# Patient Record
Sex: MALE | Race: WHITE | NOT HISPANIC OR LATINO | URBAN - METROPOLITAN AREA
[De-identification: names, ages, dates, MRNs, and addresses within clinical notes are randomized per-mention and may not be internally consistent; named-entity substitution may affect disease eponyms.]

---

## 2018-05-28 ENCOUNTER — EMERGENCY (EMERGENCY)
Facility: HOSPITAL | Age: 33
LOS: 1 days | Discharge: HOME | End: 2018-05-28
Admitting: EMERGENCY MEDICINE
Payer: MEDICAID

## 2018-05-28 VITALS
OXYGEN SATURATION: 98 % | TEMPERATURE: 98 F | HEART RATE: 75 BPM | DIASTOLIC BLOOD PRESSURE: 115 MMHG | RESPIRATION RATE: 16 BRPM | SYSTOLIC BLOOD PRESSURE: 177 MMHG

## 2018-05-28 VITALS
HEART RATE: 104 BPM | DIASTOLIC BLOOD PRESSURE: 86 MMHG | RESPIRATION RATE: 18 BRPM | SYSTOLIC BLOOD PRESSURE: 132 MMHG | OXYGEN SATURATION: 98 % | TEMPERATURE: 98 F

## 2018-05-28 LAB
ALBUMIN SERPL ELPH-MCNC: 4.8 G/DL — SIGNIFICANT CHANGE UP (ref 3.3–5)
ALP SERPL-CCNC: 67 U/L — SIGNIFICANT CHANGE UP (ref 40–120)
ALT FLD-CCNC: 15 U/L — SIGNIFICANT CHANGE UP (ref 4–41)
AMPHET UR-MCNC: NEGATIVE — SIGNIFICANT CHANGE UP
APAP SERPL-MCNC: < 15 UG/ML — LOW (ref 15–25)
APPEARANCE UR: CLEAR — SIGNIFICANT CHANGE UP
AST SERPL-CCNC: 26 U/L — SIGNIFICANT CHANGE UP (ref 4–40)
BARBITURATES UR SCN-MCNC: NEGATIVE — SIGNIFICANT CHANGE UP
BASOPHILS # BLD AUTO: 0.1 K/UL — SIGNIFICANT CHANGE UP (ref 0–0.2)
BASOPHILS NFR BLD AUTO: 1.2 % — SIGNIFICANT CHANGE UP (ref 0–2)
BENZODIAZ UR-MCNC: NEGATIVE — SIGNIFICANT CHANGE UP
BILIRUB SERPL-MCNC: 0.7 MG/DL — SIGNIFICANT CHANGE UP (ref 0.2–1.2)
BILIRUB UR-MCNC: NEGATIVE — SIGNIFICANT CHANGE UP
BLOOD UR QL VISUAL: NEGATIVE — SIGNIFICANT CHANGE UP
BUN SERPL-MCNC: 8 MG/DL — SIGNIFICANT CHANGE UP (ref 7–23)
CALCIUM SERPL-MCNC: 9.4 MG/DL — SIGNIFICANT CHANGE UP (ref 8.4–10.5)
CANNABINOIDS UR-MCNC: NEGATIVE — SIGNIFICANT CHANGE UP
CHLORIDE SERPL-SCNC: 101 MMOL/L — SIGNIFICANT CHANGE UP (ref 98–107)
CO2 SERPL-SCNC: 22 MMOL/L — SIGNIFICANT CHANGE UP (ref 22–31)
COCAINE METAB.OTHER UR-MCNC: NEGATIVE — SIGNIFICANT CHANGE UP
COLOR SPEC: SIGNIFICANT CHANGE UP
CREAT SERPL-MCNC: 1.03 MG/DL — SIGNIFICANT CHANGE UP (ref 0.5–1.3)
EOSINOPHIL # BLD AUTO: 0.08 K/UL — SIGNIFICANT CHANGE UP (ref 0–0.5)
EOSINOPHIL NFR BLD AUTO: 1 % — SIGNIFICANT CHANGE UP (ref 0–6)
ETHANOL BLD-MCNC: 274 MG/DL — HIGH
GLUCOSE SERPL-MCNC: 79 MG/DL — SIGNIFICANT CHANGE UP (ref 70–99)
GLUCOSE UR-MCNC: NEGATIVE — SIGNIFICANT CHANGE UP
HCT VFR BLD CALC: 48.3 % — SIGNIFICANT CHANGE UP (ref 39–50)
HGB BLD-MCNC: 15.5 G/DL — SIGNIFICANT CHANGE UP (ref 13–17)
IMM GRANULOCYTES # BLD AUTO: 0.03 # — SIGNIFICANT CHANGE UP
IMM GRANULOCYTES NFR BLD AUTO: 0.4 % — SIGNIFICANT CHANGE UP (ref 0–1.5)
KETONES UR-MCNC: NEGATIVE — SIGNIFICANT CHANGE UP
LEUKOCYTE ESTERASE UR-ACNC: NEGATIVE — SIGNIFICANT CHANGE UP
LYMPHOCYTES # BLD AUTO: 2.03 K/UL — SIGNIFICANT CHANGE UP (ref 1–3.3)
LYMPHOCYTES # BLD AUTO: 24.3 % — SIGNIFICANT CHANGE UP (ref 13–44)
MCHC RBC-ENTMCNC: 25.6 PG — LOW (ref 27–34)
MCHC RBC-ENTMCNC: 32.1 % — SIGNIFICANT CHANGE UP (ref 32–36)
MCV RBC AUTO: 79.7 FL — LOW (ref 80–100)
METHADONE UR-MCNC: NEGATIVE — SIGNIFICANT CHANGE UP
MONOCYTES # BLD AUTO: 0.92 K/UL — HIGH (ref 0–0.9)
MONOCYTES NFR BLD AUTO: 11 % — SIGNIFICANT CHANGE UP (ref 2–14)
NEUTROPHILS # BLD AUTO: 5.18 K/UL — SIGNIFICANT CHANGE UP (ref 1.8–7.4)
NEUTROPHILS NFR BLD AUTO: 62.1 % — SIGNIFICANT CHANGE UP (ref 43–77)
NITRITE UR-MCNC: NEGATIVE — SIGNIFICANT CHANGE UP
NRBC # FLD: 0 — SIGNIFICANT CHANGE UP
OPIATES UR-MCNC: NEGATIVE — SIGNIFICANT CHANGE UP
OXYCODONE UR-MCNC: NEGATIVE — SIGNIFICANT CHANGE UP
PCP UR-MCNC: NEGATIVE — SIGNIFICANT CHANGE UP
PH UR: 6.5 — SIGNIFICANT CHANGE UP (ref 4.6–8)
PLATELET # BLD AUTO: 208 K/UL — SIGNIFICANT CHANGE UP (ref 150–400)
PMV BLD: 11.6 FL — SIGNIFICANT CHANGE UP (ref 7–13)
POTASSIUM SERPL-MCNC: 3.7 MMOL/L — SIGNIFICANT CHANGE UP (ref 3.5–5.3)
POTASSIUM SERPL-SCNC: 3.7 MMOL/L — SIGNIFICANT CHANGE UP (ref 3.5–5.3)
PROT SERPL-MCNC: 7.9 G/DL — SIGNIFICANT CHANGE UP (ref 6–8.3)
PROT UR-MCNC: 20 MG/DL — SIGNIFICANT CHANGE UP
RBC # BLD: 6.06 M/UL — HIGH (ref 4.2–5.8)
RBC # FLD: 18.9 % — HIGH (ref 10.3–14.5)
RBC CASTS # UR COMP ASSIST: SIGNIFICANT CHANGE UP (ref 0–?)
SALICYLATES SERPL-MCNC: < 5 MG/DL — LOW (ref 15–30)
SODIUM SERPL-SCNC: 144 MMOL/L — SIGNIFICANT CHANGE UP (ref 135–145)
SP GR SPEC: 1.01 — SIGNIFICANT CHANGE UP (ref 1–1.04)
SQUAMOUS # UR AUTO: SIGNIFICANT CHANGE UP
TSH SERPL-MCNC: 2.65 UIU/ML — SIGNIFICANT CHANGE UP (ref 0.27–4.2)
UROBILINOGEN FLD QL: NORMAL MG/DL — SIGNIFICANT CHANGE UP
WBC # BLD: 8.34 K/UL — SIGNIFICANT CHANGE UP (ref 3.8–10.5)
WBC # FLD AUTO: 8.34 K/UL — SIGNIFICANT CHANGE UP (ref 3.8–10.5)
WBC UR QL: SIGNIFICANT CHANGE UP (ref 0–?)

## 2018-05-28 PROCEDURE — 99285 EMERGENCY DEPT VISIT HI MDM: CPT

## 2018-05-28 RX ORDER — NICOTINE POLACRILEX 2 MG
4 GUM BUCCAL ONCE
Qty: 0 | Refills: 0 | Status: COMPLETED | OUTPATIENT
Start: 2018-05-28 | End: 2018-05-28

## 2018-05-28 RX ADMIN — Medication 4 MILLIGRAM(S): at 20:04

## 2018-05-28 NOTE — ED PROVIDER NOTE - PROGRESS NOTE DETAILS
GLENN Harrington patient is clinically sober A+O x 3 no distress noted. Reports he bindge drinks at times and drank 2 pints of vodka today. Reports he saw his family this weekend who are "well off" and he feels bad because he does not have the same lifestyle, currently residing with parents.  Denies any SI Denies any past psych history or admission. D3enis past withdraws or seizures.  Spoke with  mother Yessica [phone] who repost no safety concerns for self for patient.

## 2018-05-28 NOTE — ED PROVIDER NOTE - MEDICAL DECISION MAKING DETAILS
This is a 33 year old Male NO PMHX  for psych eval r/t suicidal ideations.  PER EMS Patient called 911 reporting he had a knife to his throat and was going to kill himself.  Patient reports "I had a few drinks today and I feel very bad." Patient is clinically sober Medical evaluation performed. There is no clinical evidence of intoxication or any acute medical problem requiring immediate intervention. [facility] crisis center

## 2018-05-28 NOTE — ED PROVIDER NOTE - OBJECTIVE STATEMENT
This is a 33 year old Male NO PMHX  for psych eval r/t suicidal ideations.  PER EMS Patient called 911 reporting he had a knife to his throat and was going to kill himself.  Patient reports "I had a few drinks today and I feel very bad" Drinks every two-three weeks. Denies past withdraws or seizures Denies AH/VH Denies any past medical history.

## 2018-05-28 NOTE — ED ADULT NURSE NOTE - OBJECTIVE STATEMENT
Pt to [facility] ER. Pt brought in by EMS for possible suicide attempt. Pt called 911 stating he had a knife ti his neck. No knife found on seen as per EMS. Pt admits to drinking 2-3 pints of alcohol today. Pt calm and cooperative currently. All belongings accounted for. Will continue to monitor.

## 2018-05-28 NOTE — ED ADULT TRIAGE NOTE - CHIEF COMPLAINT QUOTE
Per EMS Pt called 911 reporting he had a knife to his throat and was going to kill himself.  Pt denies SI HI depression hallucinations or any complaints at this time.  Hypertensive emotionally labile in triage.

## 2018-06-05 ENCOUNTER — EMERGENCY (EMERGENCY)
Facility: HOSPITAL | Age: 33
LOS: 1 days | Discharge: HOME | End: 2018-06-05
Attending: EMERGENCY MEDICINE | Admitting: EMERGENCY MEDICINE
Payer: MEDICAID

## 2018-06-05 VITALS
HEART RATE: 124 BPM | OXYGEN SATURATION: 100 % | SYSTOLIC BLOOD PRESSURE: 121 MMHG | RESPIRATION RATE: 16 BRPM | TEMPERATURE: 98 F | DIASTOLIC BLOOD PRESSURE: 73 MMHG

## 2018-06-05 PROCEDURE — 99283 EMERGENCY DEPT VISIT LOW MDM: CPT | Mod: 25
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
## 2018-06-05 NOTE — ED PROVIDER NOTE - PHYSICAL EXAMINATION
NEURO: EOMi, PERRLA, visual fields intact, tongue midline, negative romberg, strength 5/5 UE and LE bilaterally, normal gait, facial expressions intact, point to point intact, rapid alternating movements intact, sensate intact to UE and LE bilaterally. NVI  Neck: no midline tenderness, FROM  Back: no midline tenderness, no palpable step offs, FROM  no tenderness to extremities, moving all well  normal gait. NEURO: EOMi, PERRLA, visual fields intact, tongue midline, negative romberg, strength 5/5 UE and LE bilaterally, normal gait. facial expressions intact, point to point intact, rapid alternating movements intact, sensate intact to UE and LE bilaterally. NVI  Neck: no midline tenderness, FROM  Back: no midline tenderness, no palpable step offs, FROM  no tenderness to extremities, moving all well
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
## 2018-06-05 NOTE — ED PROVIDER NOTE - MEDICAL DECISION MAKING DETAILS
32 yo M here for intox. otherwise well. normal exam. PLAN: PO challenge, clinical sobriety and dc to police custody.
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
## 2018-06-06 VITALS
RESPIRATION RATE: 18 BRPM | TEMPERATURE: 98 F | HEART RATE: 109 BPM | SYSTOLIC BLOOD PRESSURE: 150 MMHG | DIASTOLIC BLOOD PRESSURE: 89 MMHG | OXYGEN SATURATION: 100 %

## 2018-10-30 NOTE — ED PROVIDER NOTE - ASSOCIATED PAIN OR INJURY
Assessment/Plan:    Assessment:  Patient presents with 4 day history of watery/painful right eye after doing overhead mechanical work without eye protection  Fluorescein stain performed  Negative for foreign body  Will treat for corneal abrasion with Gentamycin drops x5 days  Return to office for re-evaluation upon drop completion  Diagnoses and all orders for this visit:    Abrasion of right cornea, initial encounter  -     gentamicin (GARAMYCIN) 0 3 % ophthalmic solution; Administer 1 drop to the right eye every 4 (four) hours for 5 days          Subjective:      Patient ID: Anibal Arnold is a 62 y o  male  Chief Complaint   Patient presents with    Eye Strain     Watery R Eye x3d Pt flushed eye w/ little relief   Heartburn       HPI    61 yo male presents today with complaint of watery/pain right eye since Saturday  He was grinding metal in the am during which he did wear safety glasses  He was also doing over head mechanical work on a tractor during which he was not wearing eye protection  He feels something may have fallen into his eye at that time  He did try to wash his eye out later in the day Saturday with plain water  He states it feels like something is still in his eye at times when he moves it  He has been using lubricating eye drops with minimal relief  He denies any purulent drainage or crusting  Review of Systems   Eyes: Positive for pain (feels like something is in his eye), discharge (clear/watery) and redness  Negative for photophobia, itching and visual disturbance  Objective:      /94 (BP Location: Left arm, Patient Position: Sitting, Cuff Size: Standard)   Pulse 75   Temp 97 8 °F (36 6 °C) (Tympanic)   Resp 16   Ht 5' 10" (1 778 m)   Wt 84 1 kg (185 lb 4 8 oz)   SpO2 98%   BMI 26 59 kg/m²          Physical Exam   Constitutional: He is oriented to person, place, and time  He appears well-developed and well-nourished     Eyes: Pupils are equal, round, and reactive to light  Right eye exhibits discharge  Right conjunctiva is injected  Right eye exhibits no nystagmus  Pupil equal and reactive  No visual deficit noted  Neurological: He is alert and oriented to person, place, and time  Psychiatric: He has a normal mood and affect  no discrete location documentation necessary

## 2019-01-29 ENCOUNTER — INPATIENT (INPATIENT)
Facility: HOSPITAL | Age: 34
LOS: 2 days | Discharge: HOME | End: 2019-02-01
Attending: INTERNAL MEDICINE | Admitting: INTERNAL MEDICINE
Payer: SELF-PAY

## 2019-01-29 VITALS
TEMPERATURE: 101 F | OXYGEN SATURATION: 99 % | SYSTOLIC BLOOD PRESSURE: 130 MMHG | RESPIRATION RATE: 18 BRPM | HEART RATE: 120 BPM | DIASTOLIC BLOOD PRESSURE: 90 MMHG

## 2019-01-29 LAB
ALBUMIN SERPL ELPH-MCNC: 4.2 G/DL — SIGNIFICANT CHANGE UP (ref 3.3–5)
ALP SERPL-CCNC: 74 U/L — SIGNIFICANT CHANGE UP (ref 40–120)
ALT FLD-CCNC: 14 U/L — SIGNIFICANT CHANGE UP (ref 4–41)
ANION GAP SERPL CALC-SCNC: 16 MMO/L — HIGH (ref 7–14)
AST SERPL-CCNC: 24 U/L — SIGNIFICANT CHANGE UP (ref 4–40)
B PERT DNA SPEC QL NAA+PROBE: NOT DETECTED — SIGNIFICANT CHANGE UP
BASOPHILS # BLD AUTO: 0.02 K/UL — SIGNIFICANT CHANGE UP (ref 0–0.2)
BASOPHILS NFR BLD AUTO: 0.3 % — SIGNIFICANT CHANGE UP (ref 0–2)
BILIRUB SERPL-MCNC: 0.8 MG/DL — SIGNIFICANT CHANGE UP (ref 0.2–1.2)
BUN SERPL-MCNC: 12 MG/DL — SIGNIFICANT CHANGE UP (ref 7–23)
C PNEUM DNA SPEC QL NAA+PROBE: NOT DETECTED — SIGNIFICANT CHANGE UP
CALCIUM SERPL-MCNC: 9.1 MG/DL — SIGNIFICANT CHANGE UP (ref 8.4–10.5)
CHLORIDE SERPL-SCNC: 97 MMOL/L — LOW (ref 98–107)
CO2 SERPL-SCNC: 26 MMOL/L — SIGNIFICANT CHANGE UP (ref 22–31)
CREAT SERPL-MCNC: 1.02 MG/DL — SIGNIFICANT CHANGE UP (ref 0.5–1.3)
EOSINOPHIL # BLD AUTO: 0.01 K/UL — SIGNIFICANT CHANGE UP (ref 0–0.5)
EOSINOPHIL NFR BLD AUTO: 0.1 % — SIGNIFICANT CHANGE UP (ref 0–6)
FLUAV H1 2009 PAND RNA SPEC QL NAA+PROBE: NOT DETECTED — SIGNIFICANT CHANGE UP
FLUAV H1 RNA SPEC QL NAA+PROBE: NOT DETECTED — SIGNIFICANT CHANGE UP
FLUAV H3 RNA SPEC QL NAA+PROBE: NOT DETECTED — SIGNIFICANT CHANGE UP
FLUAV SUBTYP SPEC NAA+PROBE: NOT DETECTED — SIGNIFICANT CHANGE UP
FLUBV RNA SPEC QL NAA+PROBE: NOT DETECTED — SIGNIFICANT CHANGE UP
GLUCOSE SERPL-MCNC: 104 MG/DL — HIGH (ref 70–99)
HADV DNA SPEC QL NAA+PROBE: NOT DETECTED — SIGNIFICANT CHANGE UP
HCOV PNL SPEC NAA+PROBE: DETECTED — HIGH
HCT VFR BLD CALC: 48.4 % — SIGNIFICANT CHANGE UP (ref 39–50)
HGB BLD-MCNC: 15.1 G/DL — SIGNIFICANT CHANGE UP (ref 13–17)
HMPV RNA SPEC QL NAA+PROBE: NOT DETECTED — SIGNIFICANT CHANGE UP
HPIV1 RNA SPEC QL NAA+PROBE: NOT DETECTED — SIGNIFICANT CHANGE UP
HPIV2 RNA SPEC QL NAA+PROBE: NOT DETECTED — SIGNIFICANT CHANGE UP
HPIV3 RNA SPEC QL NAA+PROBE: NOT DETECTED — SIGNIFICANT CHANGE UP
HPIV4 RNA SPEC QL NAA+PROBE: NOT DETECTED — SIGNIFICANT CHANGE UP
IMM GRANULOCYTES NFR BLD AUTO: 0.7 % — SIGNIFICANT CHANGE UP (ref 0–1.5)
LIDOCAIN IGE QN: 17.4 U/L — SIGNIFICANT CHANGE UP (ref 7–60)
LYMPHOCYTES # BLD AUTO: 0.62 K/UL — LOW (ref 1–3.3)
LYMPHOCYTES # BLD AUTO: 8.1 % — LOW (ref 13–44)
MCHC RBC-ENTMCNC: 24.8 PG — LOW (ref 27–34)
MCHC RBC-ENTMCNC: 31.2 % — LOW (ref 32–36)
MCV RBC AUTO: 79.6 FL — LOW (ref 80–100)
MONOCYTES # BLD AUTO: 0.81 K/UL — SIGNIFICANT CHANGE UP (ref 0–0.9)
MONOCYTES NFR BLD AUTO: 10.6 % — SIGNIFICANT CHANGE UP (ref 2–14)
NEUTROPHILS # BLD AUTO: 6.13 K/UL — SIGNIFICANT CHANGE UP (ref 1.8–7.4)
NEUTROPHILS NFR BLD AUTO: 80.2 % — HIGH (ref 43–77)
NRBC # FLD: 0 K/UL — LOW (ref 25–125)
PLATELET # BLD AUTO: 211 K/UL — SIGNIFICANT CHANGE UP (ref 150–400)
PMV BLD: 11.7 FL — SIGNIFICANT CHANGE UP (ref 7–13)
POTASSIUM SERPL-MCNC: 4.1 MMOL/L — SIGNIFICANT CHANGE UP (ref 3.5–5.3)
POTASSIUM SERPL-SCNC: 4.1 MMOL/L — SIGNIFICANT CHANGE UP (ref 3.5–5.3)
PROT SERPL-MCNC: 7.2 G/DL — SIGNIFICANT CHANGE UP (ref 6–8.3)
RBC # BLD: 6.08 M/UL — HIGH (ref 4.2–5.8)
RBC # FLD: 19.2 % — HIGH (ref 10.3–14.5)
RSV RNA SPEC QL NAA+PROBE: NOT DETECTED — SIGNIFICANT CHANGE UP
RV+EV RNA SPEC QL NAA+PROBE: NOT DETECTED — SIGNIFICANT CHANGE UP
SODIUM SERPL-SCNC: 139 MMOL/L — SIGNIFICANT CHANGE UP (ref 135–145)
TROPONIN T, HIGH SENSITIVITY: 10 NG/L — SIGNIFICANT CHANGE UP (ref ?–14)
TROPONIN T, HIGH SENSITIVITY: 12 NG/L — SIGNIFICANT CHANGE UP (ref ?–14)
WBC # BLD: 7.64 K/UL — SIGNIFICANT CHANGE UP (ref 3.8–10.5)
WBC # FLD AUTO: 7.64 K/UL — SIGNIFICANT CHANGE UP (ref 3.8–10.5)

## 2019-01-29 PROCEDURE — 71046 X-RAY EXAM CHEST 2 VIEWS: CPT | Mod: 26

## 2019-01-29 RX ORDER — SODIUM CHLORIDE 9 MG/ML
1000 INJECTION INTRAMUSCULAR; INTRAVENOUS; SUBCUTANEOUS ONCE
Qty: 0 | Refills: 0 | Status: COMPLETED | OUTPATIENT
Start: 2019-01-29 | End: 2019-01-29

## 2019-01-29 RX ORDER — ACETAMINOPHEN 500 MG
650 TABLET ORAL ONCE
Qty: 0 | Refills: 0 | Status: COMPLETED | OUTPATIENT
Start: 2019-01-29 | End: 2019-01-29

## 2019-01-29 RX ADMIN — SODIUM CHLORIDE 1000 MILLILITER(S): 9 INJECTION INTRAMUSCULAR; INTRAVENOUS; SUBCUTANEOUS at 18:21

## 2019-01-29 RX ADMIN — SODIUM CHLORIDE 1000 MILLILITER(S): 9 INJECTION INTRAMUSCULAR; INTRAVENOUS; SUBCUTANEOUS at 18:39

## 2019-01-29 RX ADMIN — Medication 650 MILLIGRAM(S): at 18:20

## 2019-01-29 RX ADMIN — SODIUM CHLORIDE 1000 MILLILITER(S): 9 INJECTION INTRAMUSCULAR; INTRAVENOUS; SUBCUTANEOUS at 19:03

## 2019-01-29 RX ADMIN — Medication 650 MILLIGRAM(S): at 18:39

## 2019-01-29 NOTE — ED PROVIDER NOTE - OBJECTIVE STATEMENT
34yo male pmh EtOH abuse p/w chest pain radiating to left upper extremity x 20 minutes while sitting at work today. CP a/w SOB, diaphoresis, improved with SL nitro by EMS, received ASA 162mg. Pt with cough, rhinorrhea, sore throat, fatigue x 4d. No flu shot. Mild diarrhea, 1 episode vomiting. Drank 2 pints vodka yesterday and day prior. History hospitalization for withdrawal. Denies seizures in past. Denies sick contacts, recent travel, urinary symptoms, weight changes, drug use. Smokes 4 cigarettes daily.

## 2019-01-29 NOTE — ED PROVIDER NOTE - MEDICAL DECISION MAKING DETAILS
33M with URI symptoms p/w chest pain radiating to arm. +family cardiac hx. Labs with trop. CXR. Received aspirin by EMS. IVF. Reassess.

## 2019-01-29 NOTE — ED ADULT TRIAGE NOTE - AS TEMP SITE
traZODone (DESYREL) 50 MG tablet Take 1 tablet by mouth nightly 30 tablet 0    Blood Glucose Monitoring Suppl (ACCU-CHEK CARY PLUS) w/Device KIT 1 Device by Does not apply route 2 times daily Dx: E11.9 1 kit 0    [DISCONTINUED] metFORMIN (GLUCOPHAGE) 500 MG tablet Take 1 tablet by mouth 2 times daily (with meals) 180 tablet 0    [DISCONTINUED] spironolactone (ALDACTONE) 25 MG tablet Take 1 tablet by mouth daily 90 tablet 0    Lancets MISC accu-check cary  DX: E11.9 100 each 3    metoprolol succinate (TOPROL XL) 100 MG extended release tablet TAKE 1 TABLET EVERY DAY 90 tablet 1    apixaban (ELIQUIS) 5 MG TABS tablet Take 1 tablet by mouth 2 times daily 56 tablet 0    ACCU-CHEK CARY PLUS strip TEST BLOOD SUGAR FOUR TIMES DAILY 400 strip 3    insulin glargine (LANTUS) 100 UNIT/ML injection vial Inject 80 Units into the skin nightly      insulin lispro (HUMALOG) 100 UNIT/ML injection vial Inject 20 Units into the skin 3 times daily (before meals)      torsemide (DEMADEX) 20 MG tablet Take 20 mg by mouth 2 times daily      [DISCONTINUED] atorvastatin (LIPITOR) 80 MG tablet Take 80 mg by mouth every evening      Insulin Syringe-Needle U-100 (INSULIN SYRINGE .5CC/30GX1/2\") 30G X 1/2\" 0.5 ML MISC Use 5 times a day with insulin. GENERIC IS OK. 200 each 12    Insulin Pen Needle (B-D ULTRAFINE III SHORT PEN) 31G X 8 MM MISC 1 each by Does not apply route daily 100 each 3    albuterol sulfate HFA (PROAIR HFA) 108 (90 BASE) MCG/ACT inhaler Inhale 2 puffs into the lungs every 4 hours as needed for Wheezing or Shortness of Breath 1 Inhaler 0    Blood Glucose Calibration (ACCU-CHEK INSTANT CONTROL) LIQD 1 Bottle by In Vitro route 2 times daily 1 each 0    Alcohol Swabs (B-D SINGLE USE SWABS REGULAR) PADS 1 each by Does not apply route 2 times daily 100 each 0     No facility-administered encounter medications on file as of 6/18/2018.           Jadyn Márquez D.O. oral

## 2019-01-29 NOTE — ED ADULT TRIAGE NOTE - CHIEF COMPLAINT QUOTE
Pt brought in by EMS from for chest pain and hypertensive on scene. pt noted to be febrile and tachycardic in triage. Pt denies SOB, n/v/d.

## 2019-01-29 NOTE — ED PROVIDER NOTE - ATTENDING CONTRIBUTION TO CARE
Attending note:   After face to face evaluation of this patient, I concur with above noted hx, pe, and care plan for this patient.  32 y/o M alcoholic, smoker, hypertensive, non-compliant with cough, fever, coryza, sore throat now with c.o anterior chest pain.    EKG abnormal.   Evaluation in progress

## 2019-01-29 NOTE — ED PROVIDER NOTE - ST/T WAVE
no ST changes. Biphasic TW lateral precordial leads. no ST changes. Biphasic TW lateral precordial leads. TWI aVL, II

## 2019-01-29 NOTE — ED ADULT NURSE REASSESSMENT NOTE - NS ED NURSE REASSESS COMMENT FT1
received report from KORTNEY Lynn, pt aox3 in no apparent distress endorses relief of symptoms pt states just feels tired, VSS pt afebrile at this time, fluids completed pending RVP result will continue to monitor

## 2019-01-30 DIAGNOSIS — R07.9 CHEST PAIN, UNSPECIFIED: ICD-10-CM

## 2019-01-30 DIAGNOSIS — F10.10 ALCOHOL ABUSE, UNCOMPLICATED: ICD-10-CM

## 2019-01-30 DIAGNOSIS — B34.2 CORONAVIRUS INFECTION, UNSPECIFIED: ICD-10-CM

## 2019-01-30 DIAGNOSIS — I10 ESSENTIAL (PRIMARY) HYPERTENSION: ICD-10-CM

## 2019-01-30 DIAGNOSIS — Z29.9 ENCOUNTER FOR PROPHYLACTIC MEASURES, UNSPECIFIED: ICD-10-CM

## 2019-01-30 LAB
AMPHET UR-MCNC: NEGATIVE — SIGNIFICANT CHANGE UP
ANION GAP SERPL CALC-SCNC: 11 MMO/L — SIGNIFICANT CHANGE UP (ref 7–14)
APAP SERPL-MCNC: < 15 UG/ML — LOW (ref 15–25)
APTT BLD: 31.4 SEC — SIGNIFICANT CHANGE UP (ref 27.5–36.3)
BARBITURATES UR SCN-MCNC: NEGATIVE — SIGNIFICANT CHANGE UP
BASOPHILS # BLD AUTO: 0.01 K/UL — SIGNIFICANT CHANGE UP (ref 0–0.2)
BASOPHILS NFR BLD AUTO: 0.2 % — SIGNIFICANT CHANGE UP (ref 0–2)
BENZODIAZ UR-MCNC: NEGATIVE — SIGNIFICANT CHANGE UP
BUN SERPL-MCNC: 12 MG/DL — SIGNIFICANT CHANGE UP (ref 7–23)
CALCIUM SERPL-MCNC: 7.9 MG/DL — LOW (ref 8.4–10.5)
CANNABINOIDS UR-MCNC: NEGATIVE — SIGNIFICANT CHANGE UP
CHLORIDE SERPL-SCNC: 101 MMOL/L — SIGNIFICANT CHANGE UP (ref 98–107)
CHOLEST SERPL-MCNC: 96 MG/DL — LOW (ref 120–199)
CK MB BLD-MCNC: 0.4 — SIGNIFICANT CHANGE UP (ref 0–2.5)
CK MB BLD-MCNC: 1.77 NG/ML — SIGNIFICANT CHANGE UP (ref 1–6.6)
CK SERPL-CCNC: 458 U/L — HIGH (ref 30–200)
CO2 SERPL-SCNC: 27 MMOL/L — SIGNIFICANT CHANGE UP (ref 22–31)
COCAINE METAB.OTHER UR-MCNC: NEGATIVE — SIGNIFICANT CHANGE UP
CREAT SERPL-MCNC: 1.06 MG/DL — SIGNIFICANT CHANGE UP (ref 0.5–1.3)
EOSINOPHIL # BLD AUTO: 0.03 K/UL — SIGNIFICANT CHANGE UP (ref 0–0.5)
EOSINOPHIL NFR BLD AUTO: 0.6 % — SIGNIFICANT CHANGE UP (ref 0–6)
ETHANOL BLD-MCNC: < 10 MG/DL — SIGNIFICANT CHANGE UP
GLUCOSE SERPL-MCNC: 107 MG/DL — HIGH (ref 70–99)
HBA1C BLD-MCNC: 4.3 % — SIGNIFICANT CHANGE UP (ref 4–5.6)
HCT VFR BLD CALC: 42.9 % — SIGNIFICANT CHANGE UP (ref 39–50)
HDLC SERPL-MCNC: 41 MG/DL — SIGNIFICANT CHANGE UP (ref 35–55)
HGB BLD-MCNC: 13.4 G/DL — SIGNIFICANT CHANGE UP (ref 13–17)
IMM GRANULOCYTES NFR BLD AUTO: 0.6 % — SIGNIFICANT CHANGE UP (ref 0–1.5)
INR BLD: 1.29 — HIGH (ref 0.88–1.17)
INR BLD: 1.34 — HIGH (ref 0.88–1.17)
LIPID PNL WITH DIRECT LDL SERPL: 36 MG/DL — SIGNIFICANT CHANGE UP
LYMPHOCYTES # BLD AUTO: 1.23 K/UL — SIGNIFICANT CHANGE UP (ref 1–3.3)
LYMPHOCYTES # BLD AUTO: 26.3 % — SIGNIFICANT CHANGE UP (ref 13–44)
MAGNESIUM SERPL-MCNC: 1.6 MG/DL — SIGNIFICANT CHANGE UP (ref 1.6–2.6)
MCHC RBC-ENTMCNC: 25 PG — LOW (ref 27–34)
MCHC RBC-ENTMCNC: 31.2 % — LOW (ref 32–36)
MCV RBC AUTO: 80 FL — SIGNIFICANT CHANGE UP (ref 80–100)
METHADONE UR-MCNC: NEGATIVE — SIGNIFICANT CHANGE UP
MONOCYTES # BLD AUTO: 0.64 K/UL — SIGNIFICANT CHANGE UP (ref 0–0.9)
MONOCYTES NFR BLD AUTO: 13.7 % — SIGNIFICANT CHANGE UP (ref 2–14)
NEUTROPHILS # BLD AUTO: 2.73 K/UL — SIGNIFICANT CHANGE UP (ref 1.8–7.4)
NEUTROPHILS NFR BLD AUTO: 58.6 % — SIGNIFICANT CHANGE UP (ref 43–77)
NRBC # FLD: 0 K/UL — LOW (ref 25–125)
OPIATES UR-MCNC: NEGATIVE — SIGNIFICANT CHANGE UP
OXYCODONE UR-MCNC: NEGATIVE — SIGNIFICANT CHANGE UP
PCP UR-MCNC: NEGATIVE — SIGNIFICANT CHANGE UP
PHOSPHATE SERPL-MCNC: 2.8 MG/DL — SIGNIFICANT CHANGE UP (ref 2.5–4.5)
PLATELET # BLD AUTO: 161 K/UL — SIGNIFICANT CHANGE UP (ref 150–400)
PMV BLD: 11.4 FL — SIGNIFICANT CHANGE UP (ref 7–13)
POTASSIUM SERPL-MCNC: 3.5 MMOL/L — SIGNIFICANT CHANGE UP (ref 3.5–5.3)
POTASSIUM SERPL-SCNC: 3.5 MMOL/L — SIGNIFICANT CHANGE UP (ref 3.5–5.3)
PROTHROM AB SERPL-ACNC: 14.8 SEC — HIGH (ref 9.8–13.1)
PROTHROM AB SERPL-ACNC: 15 SEC — HIGH (ref 9.8–13.1)
RBC # BLD: 5.36 M/UL — SIGNIFICANT CHANGE UP (ref 4.2–5.8)
RBC # FLD: 18.6 % — HIGH (ref 10.3–14.5)
SALICYLATES SERPL-MCNC: < 5 MG/DL — LOW (ref 15–30)
SODIUM SERPL-SCNC: 139 MMOL/L — SIGNIFICANT CHANGE UP (ref 135–145)
TRIGL SERPL-MCNC: 109 MG/DL — SIGNIFICANT CHANGE UP (ref 10–149)
TROPONIN T, HIGH SENSITIVITY: 9 NG/L — SIGNIFICANT CHANGE UP (ref ?–14)
TSH SERPL-MCNC: 2.98 UIU/ML — SIGNIFICANT CHANGE UP (ref 0.27–4.2)
WBC # BLD: 4.67 K/UL — SIGNIFICANT CHANGE UP (ref 3.8–10.5)
WBC # FLD AUTO: 4.67 K/UL — SIGNIFICANT CHANGE UP (ref 3.8–10.5)

## 2019-01-30 PROCEDURE — 12345: CPT | Mod: NC

## 2019-01-30 PROCEDURE — 93010 ELECTROCARDIOGRAM REPORT: CPT

## 2019-01-30 PROCEDURE — 99222 1ST HOSP IP/OBS MODERATE 55: CPT

## 2019-01-30 PROCEDURE — 99223 1ST HOSP IP/OBS HIGH 75: CPT

## 2019-01-30 RX ORDER — THIAMINE MONONITRATE (VIT B1) 100 MG
100 TABLET ORAL DAILY
Qty: 0 | Refills: 0 | Status: DISCONTINUED | OUTPATIENT
Start: 2019-01-31 | End: 2019-02-01

## 2019-01-30 RX ORDER — SODIUM CHLORIDE 9 MG/ML
1000 INJECTION, SOLUTION INTRAVENOUS
Qty: 0 | Refills: 0 | Status: DISCONTINUED | OUTPATIENT
Start: 2019-01-30 | End: 2019-01-30

## 2019-01-30 RX ORDER — ACETAMINOPHEN 500 MG
650 TABLET ORAL EVERY 6 HOURS
Qty: 0 | Refills: 0 | Status: DISCONTINUED | OUTPATIENT
Start: 2019-01-30 | End: 2019-02-01

## 2019-01-30 RX ORDER — THIAMINE MONONITRATE (VIT B1) 100 MG
100 TABLET ORAL ONCE
Qty: 0 | Refills: 0 | Status: COMPLETED | OUTPATIENT
Start: 2019-01-30 | End: 2019-01-30

## 2019-01-30 RX ORDER — FOLIC ACID 0.8 MG
1 TABLET ORAL DAILY
Qty: 0 | Refills: 0 | Status: DISCONTINUED | OUTPATIENT
Start: 2019-01-31 | End: 2019-02-01

## 2019-01-30 RX ORDER — CALCIUM CARBONATE 500(1250)
1 TABLET ORAL ONCE
Qty: 0 | Refills: 0 | Status: COMPLETED | OUTPATIENT
Start: 2019-01-30 | End: 2019-01-30

## 2019-01-30 RX ORDER — ASPIRIN/CALCIUM CARB/MAGNESIUM 324 MG
81 TABLET ORAL DAILY
Qty: 0 | Refills: 0 | Status: DISCONTINUED | OUTPATIENT
Start: 2019-01-30 | End: 2019-02-01

## 2019-01-30 RX ORDER — HYDRALAZINE HCL 50 MG
10 TABLET ORAL THREE TIMES A DAY
Qty: 0 | Refills: 0 | Status: DISCONTINUED | OUTPATIENT
Start: 2019-01-30 | End: 2019-02-01

## 2019-01-30 RX ORDER — SODIUM CHLORIDE 9 MG/ML
1000 INJECTION, SOLUTION INTRAVENOUS
Qty: 0 | Refills: 0 | Status: DISCONTINUED | OUTPATIENT
Start: 2019-01-30 | End: 2019-02-01

## 2019-01-30 RX ADMIN — Medication 50 MILLIGRAM(S): at 20:34

## 2019-01-30 RX ADMIN — Medication 10 MILLIGRAM(S): at 14:06

## 2019-01-30 RX ADMIN — Medication 1 TABLET(S): at 14:06

## 2019-01-30 RX ADMIN — Medication 650 MILLIGRAM(S): at 18:57

## 2019-01-30 RX ADMIN — Medication 81 MILLIGRAM(S): at 12:20

## 2019-01-30 RX ADMIN — SODIUM CHLORIDE 100 MILLILITER(S): 9 INJECTION, SOLUTION INTRAVENOUS at 02:13

## 2019-01-30 RX ADMIN — SODIUM CHLORIDE 100 MILLILITER(S): 9 INJECTION, SOLUTION INTRAVENOUS at 14:06

## 2019-01-30 RX ADMIN — Medication 650 MILLIGRAM(S): at 18:04

## 2019-01-30 RX ADMIN — Medication 200 MILLIGRAM(S): at 07:16

## 2019-01-30 RX ADMIN — Medication 50 MILLIGRAM(S): at 14:06

## 2019-01-30 RX ADMIN — Medication 50 MILLIGRAM(S): at 02:45

## 2019-01-30 RX ADMIN — Medication 100 MILLIGRAM(S): at 01:25

## 2019-01-30 RX ADMIN — Medication 50 MILLIGRAM(S): at 08:44

## 2019-01-30 RX ADMIN — Medication 10 MILLIGRAM(S): at 21:49

## 2019-01-30 NOTE — H&P ADULT - PROBLEM SELECTOR PLAN 2
hx of ETOH abuse, with withdrawals, but no DTs, seizure hx. Last drink was Monday.   Given high risk of withdrawal, will start Librium taper, high risk CIWA  banana bag for 24 hours, c/w folic acid, thiamine orally after that   aspiration, seizure, fall precautions

## 2019-01-30 NOTE — H&P ADULT - NSHPREVIEWOFSYSTEMS_GEN_ALL_CORE
REVIEW OF SYSTEMS:    CONSTITUTIONAL: No weakness, fevers or chills  EYES/ENT: No visual changes;  No vertigo or throat pain   NECK: No pain or stiffness  RESPIRATORY: +cough, +shortness of breath  CARDIOVASCULAR: +CP  GASTROINTESTINAL: No abdominal or epigastric pain. No nausea, +vomiting, diarrhea. No melena or hematochezia.  GENITOURINARY: No dysuria, frequency or hematuria  NEUROLOGICAL: No numbness or weakness  SKIN: No itching, burning, rashes, or lesions   All other review of systems is negative unless indicated above.

## 2019-01-30 NOTE — H&P ADULT - HISTORY OF PRESENT ILLNESS
34 y/o M w/ PMHx HTN (not on meds), EtOH abuse (hx of withdrawal, no seizure), active smoker (4 cig/day) presenting with acute left sided CP. Pt states pain started on Tuesday while he was working, around 5 pm, pain was left sided chest pain, radiating to the left arm, associated with numbness. Pain lasted about 20 minutes, and was not worsening with palpation, non pleuritic. Since that time pain has been intermittent, only improved with sublingual nitroglycerin given by EMS. Pt also got  by EMS. Associated symptoms include: SOB, sweating, cough, diarrhea, vomiting x 1. No family history of heart attacks, sudden death.  Denies: syncope, nausea, sick contacts. Never had chest pain like this before.  Pt has a history of EtOH abuse, last drank on Sunday, Monday about 2-3 pints of Vodka, last drink before that was 2 weeks prior. Has history of withdrawal, never been to the ICU, never had seizures. Last time he had withdrawal like symptoms was a few weeks ago.    In the ED, vitals: T: 98.4, HR:  BP: 146-159/72-95, RR: 18, SpO2: 98% on RA  EKG showing no TEODORO, STD. TWI I, II, aVL, V4-V6  Trops 10 --> 12, RVP + for coronavirus

## 2019-01-30 NOTE — H&P ADULT - ASSESSMENT
32 y/o M w/ PMHx HTN (not on meds), EtOH abuse (hx of withdrawal, no seizure), active smoker (4 cig/day) presenting with acute left sided CP found to have cornavirus, r/o ACS at this time. On librium taper.

## 2019-01-30 NOTE — CHART NOTE - NSCHARTNOTEFT_GEN_A_CORE
**MEDICINE SECOND TOUCH**    Patient seen and examined this AM at bedside. Reports CP has resolved. CIWA scores remains low. Await TTE results. Labs and imaging reviewed:                          13.4   4.67  )-----------( 161      ( 30 Jan 2019 05:20 )             42.9     01-30    139  |  101  |  12  ----------------------------<  107<H>  3.5   |  27  |  1.06    Ca    7.9<L>      30 Jan 2019 05:20  Phos  2.8     01-30  Mg     1.6     01-30    TPro  7.2  /  Alb  4.2  /  TBili  0.8  /  DBili  x   /  AST  24  /  ALT  14  /  AlkPhos  74  01-29        Collette Vazquez  [id number]

## 2019-01-30 NOTE — ED ADULT NURSE REASSESSMENT NOTE - NS ED NURSE REASSESS COMMENT FT1
report given to ESSU1 RN. Pt brought over in no apparent distress aox3, denying any chest pain, headache, weakness or any other symptoms at this time, pt VSS, CIWA 0, NSR on monitor CIWA to be done @645 accepting RN aware

## 2019-01-30 NOTE — H&P ADULT - PROBLEM SELECTOR PLAN 3
hx of HTN, not on any meds at home  continue to monitor hx of HTN, not on any meds at home  consider metoprolol if needed  continue to monitor

## 2019-01-30 NOTE — H&P ADULT - NSHPPHYSICALEXAM_GEN_ALL_CORE
PHYSICAL EXAM:  GENERAL: NAD, well-developed  HEAD:  Atraumatic, Normocephalic  EYES: EOMI, PERRLA, conjunctiva and sclera clear  NECK: Supple, No JVD  CHEST/LUNG: Clear to auscultation bilaterally; No wheeze. Left chest wall not TTP.  HEART: Regular rate and rhythm; No murmurs, rubs, or gallops  ABDOMEN: Soft, Nontender, Nondistended; Bowel sounds present  EXTREMITIES:  2+ Peripheral Pulses, No clubbing, cyanosis, or edema. No tremors of UE b/l   PSYCH: AAOx3  NEUROLOGY: non-focal  SKIN: No rashes or lesions

## 2019-01-30 NOTE — H&P ADULT - NSHPLABSRESULTS_GEN_ALL_CORE
15.1   7.64  )-----------( 211      ( 29 Jan 2019 18:20 )             48.4       01-29    139  |  97<L>  |  12  ----------------------------<  104<H>  4.1   |  26  |  1.02    Ca    9.1      29 Jan 2019 18:20    TPro  7.2  /  Alb  4.2  /  TBili  0.8  /  DBili  x   /  AST  24  /  ALT  14  /  AlkPhos  74  01-29                  PT/INR - ( 30 Jan 2019 01:31 )   PT: 14.8 SEC;   INR: 1.29          PTT - ( 30 Jan 2019 01:31 )  PTT:31.4 SEC    Lactate Trend            CAPILLARY BLOOD GLUCOSE

## 2019-01-30 NOTE — CONSULT NOTE ADULT - SUBJECTIVE AND OBJECTIVE BOX
CHIEF COMPLAINT: chest pain     HISTORY OF PRESENT ILLNESS:32yo male pmh EtOH abuse, history  of hospitalization for withdrawal  p/w chest pain radiating to left upper extremity x 20 minutes while sitting at work today. CP was associate with SOB, diaphoresis, improved with SL nitro by EMS, received ASA 162mg. Pt with cough, rhinorrhea, sore throat, fatigue x 4d.  Mild diarrhea and  1 episode vomiting. Drank 2 pints vodka yesterday and day prior. . Denies seizures in past. Denies sick contacts, recent travel, urinary symptoms, weight changes, drug use. Smokes 4 cigarettes daily.     EKG: no ST changes. Biphasic TW lateral precordial leads. TWI aVL,    Allergies: No Known Allergies      	    MEDICATIONS:              thiamine 100 milliGRAM(s) Oral Once      PAST MEDICAL & SURGICAL HISTORY:  ETOH abuse  No significant past surgical history      FAMILY HISTORY:        SOCIAL HISTORY:    [ ] live with:  [ ] Non-smoker,[ ] smoker    [ ] no alcohol use [ ] alcohol use:      REVIEW OF SYSTEMS:  General: no fatigue/malaise, weight loss/gain.  Skin: no rashes.  Ophthalmologic: no blurred vision, no loss of vision. 	  ENT: no sore throat, rhinorrhea, sinus congestion.  Cardiovascular:no chest pain ,no palpitation,no dizziness,no diaphoresis,no edema  Respiratory: no SOB, cough or wheeze.  Gastrointestinal:  no N/V/D, no melena/hematemesis/hematochezia.  Genitourinary: no dysuria/hesitancy or hematuria.  Musculoskeletal: no myalgias or arthralgias.  Neurological: no changes in vision or hearing, no lightheadedness/dizziness, no syncope/near syncope	  Psychiatric: no unusual stress/anxiety.       PHYSICAL EXAM:  T(C): 36.9 (01-30-19 @ 00:04), Max: 38.2 (01-29-19 @ 17:51)  HR: 104 (01-30-19 @ 00:04) (104 - 120)  BP: 146/72 (01-30-19 @ 00:04) (130/90 - 159/95)  RR: 19 (01-30-19 @ 00:04) (18 - 20)  SpO2: 100% (01-30-19 @ 00:04) (97% - 100%)  Wt(kg): --  I&O's Summary      Appearance: Normal	  HEENT:   Normal oral mucosa, PERRL, EOMI	  Lymphatic: No lymphadenopathy  Cardiovascular: Normal S1 S2, No JVD, No murmurs, No edema  Respiratory: Lungs clear to auscultation	  Psychiatry: A & O x 3, Mood & affect appropriate  Gastrointestinal:  Soft, Non-tender, + BS	  Skin: No rashes, No ecchymoses, No cyanosis	  Neurologic: Non-focal  Extremities: Normal range of motion, No clubbing, cyanosis or edema  Vascular: Peripheral pulses palpable 2+ bilaterally        LABS:	 	    CBC Full  -  ( 29 Jan 2019 18:20 )  WBC Count : 7.64 K/uL  Hemoglobin : 15.1 g/dL  Hematocrit : 48.4 %  Platelet Count - Automated : 211 K/uL  Mean Cell Volume : 79.6 fL  Mean Cell Hemoglobin : 24.8 pg  Mean Cell Hemoglobin Concentration : 31.2 %  Auto Neutrophil # : 6.13 K/uL  Auto Lymphocyte # : 0.62 K/uL  Auto Monocyte # : 0.81 K/uL  Auto Eosinophil # : 0.01 K/uL  Auto Basophil # : 0.02 K/uL  Auto Neutrophil % : 80.2 %  Auto Lymphocyte % : 8.1 %  Auto Monocyte % : 10.6 %  Auto Eosinophil % : 0.1 %  Auto Basophil % : 0.3 %    01-29    139  |  97<L>  |  12  ----------------------------<  104<H>  4.1   |  26  |  1.02    Ca    9.1      29 Jan 2019 18:20    TPro  7.2  /  Alb  4.2  /  TBili  0.8  /  DBili  x   /  AST  24  /  ALT  14  /  AlkPhos  74  01-29      proBNP:   Lipid Profile:   HgA1c:   TSH:     High sensitive trop 10<< 12      CARDIAC MARKERS:          	    ECG:  	  RADIOLOGY:< from: Xray Chest 2 Views PA/Lat (01.29.19 @ 18:30) >  no urgent/emergent findings.    < end of copied text >     	     	  	  ASSESSMENT/PLAN: CHIEF COMPLAINT: chest pain     HISTORY OF PRESENT ILLNESS:32yo male pmh EtOH abuse, history  of hospitalization for withdrawal  presents with 10/10 sharp chest pain  radiating to left upper extremity x 20 minutes while sitting at work today. CP was associate with SOB, diaphoresis , dizziness , improved with SL nitro by EMS, received ASA 162mg. Patient also c/o cough, rhinorrhea, sore throat, fatigue x 4d.  Mild diarrhea and  1 episode vomiting x 2days ago. Drank 2 pints vodka yesterday and day prior.  Denies seizures in past. Denies sick contacts, recent travel, urinary symptoms, weight changes, drug use. Smokes 4 cigarettes daily. he report intermittent 4/10 left sided  non radiating chest pain for seconds since he is in hospital .The last pain was 1 hrs ago.cardiologu consulted for chest pain and EKG changes    EKG: NSR with no ST changes. Biphasic TWI in I, II.III,aVF,V6  biphasic TW in V4-V5 ,LVH    Allergies: No Known Allergies      	    MEDICATIONS:no home meds              thiamine 100 milliGRAM(s) Oral Once      PAST MEDICAL & SURGICAL HISTORY:  ETOH abuse  No significant past surgical history      FAMILY HISTORY:  no significant family history         SOCIAL HISTORY:    [ ] live with: alone   smoke 4 cigs /day   drink alcohol 2pints every weekend       REVIEW OF SYSTEMS:  General: + fatigue/malaise, no  weight loss/gain.  Skin: no rashes.  Ophthalmologic: no blurred vision, no loss of vision.. + tearing eye	  ENT: + sore throat, + rhinorrhea, + sinus congestion.  Cardiovascular:+ chest pain ,no palpitation, + dizziness, + diaphoresis,no edema  Respiratory: + SOB, no cough or wheeze.  Gastrointestinal:  + N/V/D, no melena/hematemesis/hematochezia.  Genitourinary: no dysuria/hesitancy or hematuria.  Musculoskeletal: no myalgias or arthralgias.  Neurological: no changes in vision or hearing, no lightheadedness/dizziness, no syncope/near syncope	  Psychiatric: no unusual stress/anxiety.       PHYSICAL EXAM:  T(C): 36.9 (01-30-19 @ 00:04), Max: 38.2 (01-29-19 @ 17:51)  HR: 104 (01-30-19 @ 00:04) (104 - 120)  BP: 146/72 (01-30-19 @ 00:04) (130/90 - 159/95)  RR: 19 (01-30-19 @ 00:04) (18 - 20)  SpO2: 100% (01-30-19 @ 00:04) (97% - 100%)      Appearance: Normal	  HEENT:   Normal oral mucosa, PERRL, EOMI	  Cardiovascular: Normal S1 S2, No JVD, No murmurs, No edema  Respiratory: Lungs clear to auscultation	  Psychiatry: A & O x 3, Mood & affect appropriate  Gastrointestinal:  Soft, Non-tender, + BS	  Skin: No rashes, No ecchymoses, No cyanosis	  Neurologic: Non-focal  Extremities: Normal range of motion, No clubbing, cyanosis or edema  Vascular: Peripheral pulses palpable 2+ bilaterally        LABS:	 	    CBC Full  -  ( 29 Jan 2019 18:20 )  WBC Count : 7.64 K/uL  Hemoglobin : 15.1 g/dL  Hematocrit : 48.4 %  Platelet Count - Automated : 211 K/uL  Mean Cell Volume : 79.6 fL  Mean Cell Hemoglobin : 24.8 pg  Mean Cell Hemoglobin Concentration : 31.2 %  Auto Neutrophil # : 6.13 K/uL  Auto Lymphocyte # : 0.62 K/uL  Auto Monocyte # : 0.81 K/uL  Auto Eosinophil # : 0.01 K/uL  Auto Basophil # : 0.02 K/uL  Auto Neutrophil % : 80.2 %  Auto Lymphocyte % : 8.1 %  Auto Monocyte % : 10.6 %  Auto Eosinophil % : 0.1 %  Auto Basophil % : 0.3 %    01-29    139  |  97<L>  |  12  ----------------------------<  104<H>  4.1   |  26  |  1.02    Ca    9.1      29 Jan 2019 18:20    TPro  7.2  /  Alb  4.2  /  TBili  0.8  /  DBili  x   /  AST  24  /  ALT  14  /  AlkPhos  74  01-29          High sensitive trop 10<< 12      CARDIAC MARKERS:          	    ECG:  	  RADIOLOGY:< from: Xray Chest 2 Views PA/Lat (01.29.19 @ 18:30) >  no urgent/emergent findings.    < end of copied text >     	     	  	  ASSESSMENT/PLAN: 32yo male pmh EtOH abuse, history  of hospitalization for withdrawal  presents with 10/10 sharp chest pain associates with sob, dizziness, diaphoresis found to have TWI in multiple leads with LVH criteria also with elevated BP    Plan:   Admit to medicine /tele  trend trop/ck  echo in am CHIEF COMPLAINT: chest pain     HISTORY OF PRESENT ILLNESS:34yo male pmh EtOH abuse, history  of hospitalization for withdrawal  presents with 10/10 sharp chest pain  radiating to left upper extremity x 20 minutes while sitting at work today. CP was associate with SOB, diaphoresis , dizziness , improved with SL nitro by EMS, received ASA 162mg. Patient also c/o cough, rhinorrhea, sore throat, fatigue x 4d.  Mild diarrhea and  1 episode vomiting x 2days ago. Drank 2 pints vodka yesterday and day prior.  Denies seizures in past. Denies sick contacts, recent travel, urinary symptoms, weight changes, drug use. Smokes 4 cigarettes daily. he report intermittent 4/10 left sided  non radiating chest pain for seconds since he is in hospital .The last pain was 1 hrs ago.cardiologu consulted for chest pain and EKG changes    EKG: NSR with no ST changes. Biphasic TWI in I, II.III,aVF,V6  biphasic TW in V4-V5 ,LVH    Allergies: No Known Allergies      	    MEDICATIONS:no home meds              thiamine 100 milliGRAM(s) Oral Once      PAST MEDICAL & SURGICAL HISTORY:  ETOH abuse  No significant past surgical history      FAMILY HISTORY:  no significant family history         SOCIAL HISTORY:    [ ] live with: alone   smoke 4 cigs /day   drink alcohol 2pints every weekend       REVIEW OF SYSTEMS:  General: + fatigue/malaise, no  weight loss/gain.  Skin: no rashes.  Ophthalmologic: no blurred vision, no loss of vision.. + tearing eye	  ENT: + sore throat, + rhinorrhea, + sinus congestion.  Cardiovascular:+ chest pain ,no palpitation, + dizziness, + diaphoresis,no edema  Respiratory: + SOB, no cough or wheeze.  Gastrointestinal:  + N/V/D, no melena/hematemesis/hematochezia.  Genitourinary: no dysuria/hesitancy or hematuria.  Musculoskeletal: no myalgias or arthralgias.  Neurological: no changes in vision or hearing, no lightheadedness/dizziness, no syncope/near syncope	  Psychiatric: no unusual stress/anxiety.       PHYSICAL EXAM:  T(C): 36.9 (01-30-19 @ 00:04), Max: 38.2 (01-29-19 @ 17:51)  HR: 104 (01-30-19 @ 00:04) (104 - 120)  BP: 146/72 (01-30-19 @ 00:04) (130/90 - 159/95)  RR: 19 (01-30-19 @ 00:04) (18 - 20)  SpO2: 100% (01-30-19 @ 00:04) (97% - 100%)      Appearance: Normal	  HEENT:   Normal oral mucosa, PERRL, EOMI	  Cardiovascular: Normal S1 S2, No JVD, No murmurs, No edema  Respiratory: Lungs clear to auscultation	  Psychiatry: A & O x 3, Mood & affect appropriate  Gastrointestinal:  Soft, Non-tender, + BS	  Skin: No rashes, No ecchymoses, No cyanosis	  Neurologic: Non-focal  Extremities: Normal range of motion, No clubbing, cyanosis or edema  Vascular: Peripheral pulses palpable 2+ bilaterally        LABS:	 	    CBC Full  -  ( 29 Jan 2019 18:20 )  WBC Count : 7.64 K/uL  Hemoglobin : 15.1 g/dL  Hematocrit : 48.4 %  Platelet Count - Automated : 211 K/uL  Mean Cell Volume : 79.6 fL  Mean Cell Hemoglobin : 24.8 pg  Mean Cell Hemoglobin Concentration : 31.2 %  Auto Neutrophil # : 6.13 K/uL  Auto Lymphocyte # : 0.62 K/uL  Auto Monocyte # : 0.81 K/uL  Auto Eosinophil # : 0.01 K/uL  Auto Basophil # : 0.02 K/uL  Auto Neutrophil % : 80.2 %  Auto Lymphocyte % : 8.1 %  Auto Monocyte % : 10.6 %  Auto Eosinophil % : 0.1 %  Auto Basophil % : 0.3 %    01-29    139  |  97<L>  |  12  ----------------------------<  104<H>  4.1   |  26  |  1.02    Ca    9.1      29 Jan 2019 18:20    TPro  7.2  /  Alb  4.2  /  TBili  0.8  /  DBili  x   /  AST  24  /  ALT  14  /  AlkPhos  74  01-29          High sensitive trop 10<< 12      CARDIAC MARKERS:          	    ECG:  	  RADIOLOGY:< from: Xray Chest 2 Views PA/Lat (01.29.19 @ 18:30) >  no urgent/emergent findings.    < end of copied text >     	     	  	  ASSESSMENT/PLAN: 34yo male pmh EtOH abuse, history  of hospitalization for withdrawal  presents with 10/10 sharp chest pain associates with sob, dizziness, diaphoresis found to have TWI in multiple leads with LVH criteria also with elevated BP    Plan:   Admit to medicine /tele  trend trop/ck  echo in am  start on hydralazine 10mg PO tid

## 2019-01-30 NOTE — H&P ADULT - PROBLEM SELECTOR PLAN 1
p/w typical CP (left sided, radiation to L arm, lasting few minutes to 20 min, relieved with SL NG), Risk factors include: HTN, obesity, alcohol abuse, active smoking.  HTrops 10-->12, EKG with TWI lateral, precordial leads  s/p , will continue with daily   TSH, lipid panel, HbA1c in the AM   Cardiology c/s, will consider Stress test p/w typical CP (left sided, radiation to L arm, lasting few minutes to 20 min, relieved with SL NG), Risk factors include: HTN, obesity, alcohol abuse, active smoking.  HTrops 10-->12, EKG with TWI lateral, precordial leads  s/p , will continue with daily   TSH, lipid panel, HbA1c in the AM   Cardiology c/s, will order TTE, trend CK, trop  consider Stress test in AM

## 2019-01-30 NOTE — CHART NOTE - NSCHARTNOTEFT_GEN_A_CORE
Informed by RN that patient with sinus tachycardia 116bpm, /103.   EKG performed with sinus tachychardia at 109 bpm.   Patient assessed at beside. C/o cough- patient positive for coronavirus on RVP. Denies chest pain, SOB or other symptoms.   Repeat /104. Temp 101, orally.     PE:   General: NAD   Heart: Tachycardia, +S1, +S2  Lungs: CTA bilaterally. No wheezing or crackles     Suspect sinus tachycardia i/s/o fever. Will give Tylenol. Repeat blood pressure following Tylenol. If remains elevated, consider additional oral anti-hypertensive medication. If patient remains tachycardic, consider hydralazine as cause for rebound tachycardia as this is a new medication for the patient. CXR with clear lungs. UTox negative. Will order UA and BCx for infectious w/u.     Theyumiko Acosta PA-C  [id number] Informed by RN that patient with sinus tachycardia 116bpm, /103.   EKG performed with sinus tachychardia at 109 bpm.   Patient assessed at beside. C/o cough- patient positive for coronavirus on RVP. Denies chest pain, SOB or other symptoms.   Repeat /104. Temp 101, orally.     PE:   General: NAD   Heart: Tachycardia, +S1, +S2  Lungs: CTA bilaterally. No wheezing or crackles     Suspect sinus tachycardia i/s/o fever. Will give Tylenol. Repeat blood pressure following Tylenol. If remains elevated, consider additional oral anti-hypertensive medication. If patient remains tachycardic, consider hydralazine as cause for rebound tachycardia as this is a new medication for the patient. CIWA 0-2. CXR with clear lungs. UTox negative. Will order UA and BCx for infectious w/u.     Theyumiko Acosta PA-C  [id number]

## 2019-01-31 DIAGNOSIS — F10.239 ALCOHOL DEPENDENCE WITH WITHDRAWAL, UNSPECIFIED: ICD-10-CM

## 2019-01-31 DIAGNOSIS — R07.9 CHEST PAIN, UNSPECIFIED: ICD-10-CM

## 2019-01-31 DIAGNOSIS — I10 ESSENTIAL (PRIMARY) HYPERTENSION: ICD-10-CM

## 2019-01-31 LAB
ANION GAP SERPL CALC-SCNC: 16 MMO/L — HIGH (ref 7–14)
APPEARANCE UR: CLEAR — SIGNIFICANT CHANGE UP
BACTERIA # UR AUTO: NEGATIVE — SIGNIFICANT CHANGE UP
BASOPHILS # BLD AUTO: 0.03 K/UL — SIGNIFICANT CHANGE UP (ref 0–0.2)
BASOPHILS NFR BLD AUTO: 0.6 % — SIGNIFICANT CHANGE UP (ref 0–2)
BILIRUB UR-MCNC: NEGATIVE — SIGNIFICANT CHANGE UP
BLOOD UR QL VISUAL: NEGATIVE — SIGNIFICANT CHANGE UP
BUN SERPL-MCNC: 9 MG/DL — SIGNIFICANT CHANGE UP (ref 7–23)
CALCIUM SERPL-MCNC: 9.4 MG/DL — SIGNIFICANT CHANGE UP (ref 8.4–10.5)
CHLORIDE SERPL-SCNC: 99 MMOL/L — SIGNIFICANT CHANGE UP (ref 98–107)
CK SERPL-CCNC: 386 U/L — HIGH (ref 30–200)
CO2 SERPL-SCNC: 25 MMOL/L — SIGNIFICANT CHANGE UP (ref 22–31)
COLOR SPEC: YELLOW — SIGNIFICANT CHANGE UP
CREAT SERPL-MCNC: 1.06 MG/DL — SIGNIFICANT CHANGE UP (ref 0.5–1.3)
D DIMER BLD IA.RAPID-MCNC: 317 NG/ML — SIGNIFICANT CHANGE UP
EOSINOPHIL # BLD AUTO: 0.09 K/UL — SIGNIFICANT CHANGE UP (ref 0–0.5)
EOSINOPHIL NFR BLD AUTO: 1.7 % — SIGNIFICANT CHANGE UP (ref 0–6)
GLUCOSE SERPL-MCNC: 91 MG/DL — SIGNIFICANT CHANGE UP (ref 70–99)
GLUCOSE UR-MCNC: NEGATIVE — SIGNIFICANT CHANGE UP
HCT VFR BLD CALC: 50.1 % — HIGH (ref 39–50)
HGB BLD-MCNC: 15.5 G/DL — SIGNIFICANT CHANGE UP (ref 13–17)
HYALINE CASTS # UR AUTO: NEGATIVE — SIGNIFICANT CHANGE UP
IMM GRANULOCYTES NFR BLD AUTO: 0.6 % — SIGNIFICANT CHANGE UP (ref 0–1.5)
KETONES UR-MCNC: SIGNIFICANT CHANGE UP
LACTATE SERPL-SCNC: 1.4 MMOL/L — SIGNIFICANT CHANGE UP (ref 0.5–2)
LEUKOCYTE ESTERASE UR-ACNC: NEGATIVE — SIGNIFICANT CHANGE UP
LYMPHOCYTES # BLD AUTO: 1.55 K/UL — SIGNIFICANT CHANGE UP (ref 1–3.3)
LYMPHOCYTES # BLD AUTO: 29.9 % — SIGNIFICANT CHANGE UP (ref 13–44)
MAGNESIUM SERPL-MCNC: 2.1 MG/DL — SIGNIFICANT CHANGE UP (ref 1.6–2.6)
MCHC RBC-ENTMCNC: 25 PG — LOW (ref 27–34)
MCHC RBC-ENTMCNC: 30.9 % — LOW (ref 32–36)
MCV RBC AUTO: 80.9 FL — SIGNIFICANT CHANGE UP (ref 80–100)
MONOCYTES # BLD AUTO: 0.66 K/UL — SIGNIFICANT CHANGE UP (ref 0–0.9)
MONOCYTES NFR BLD AUTO: 12.7 % — SIGNIFICANT CHANGE UP (ref 2–14)
NEUTROPHILS # BLD AUTO: 2.83 K/UL — SIGNIFICANT CHANGE UP (ref 1.8–7.4)
NEUTROPHILS NFR BLD AUTO: 54.5 % — SIGNIFICANT CHANGE UP (ref 43–77)
NITRITE UR-MCNC: NEGATIVE — SIGNIFICANT CHANGE UP
NRBC # FLD: 0 K/UL — LOW (ref 25–125)
PH UR: 6 — SIGNIFICANT CHANGE UP (ref 5–8)
PHOSPHATE SERPL-MCNC: 3.8 MG/DL — SIGNIFICANT CHANGE UP (ref 2.5–4.5)
PLATELET # BLD AUTO: 178 K/UL — SIGNIFICANT CHANGE UP (ref 150–400)
PMV BLD: 11.6 FL — SIGNIFICANT CHANGE UP (ref 7–13)
POTASSIUM SERPL-MCNC: 3.8 MMOL/L — SIGNIFICANT CHANGE UP (ref 3.5–5.3)
POTASSIUM SERPL-SCNC: 3.8 MMOL/L — SIGNIFICANT CHANGE UP (ref 3.5–5.3)
PROT UR-MCNC: 20 — SIGNIFICANT CHANGE UP
RBC # BLD: 6.19 M/UL — HIGH (ref 4.2–5.8)
RBC # FLD: 19 % — HIGH (ref 10.3–14.5)
RBC CASTS # UR COMP ASSIST: SIGNIFICANT CHANGE UP (ref 0–?)
SODIUM SERPL-SCNC: 140 MMOL/L — SIGNIFICANT CHANGE UP (ref 135–145)
SP GR SPEC: 1.02 — SIGNIFICANT CHANGE UP (ref 1–1.04)
SPECIMEN SOURCE: SIGNIFICANT CHANGE UP
SQUAMOUS # UR AUTO: SIGNIFICANT CHANGE UP
UROBILINOGEN FLD QL: NORMAL — SIGNIFICANT CHANGE UP
WBC # BLD: 5.19 K/UL — SIGNIFICANT CHANGE UP (ref 3.8–10.5)
WBC # FLD AUTO: 5.19 K/UL — SIGNIFICANT CHANGE UP (ref 3.8–10.5)
WBC UR QL: SIGNIFICANT CHANGE UP (ref 0–?)

## 2019-01-31 PROCEDURE — 93306 TTE W/DOPPLER COMPLETE: CPT | Mod: 26

## 2019-01-31 PROCEDURE — 99233 SBSQ HOSP IP/OBS HIGH 50: CPT

## 2019-01-31 RX ORDER — HYDRALAZINE HCL 50 MG
10 TABLET ORAL ONCE
Qty: 0 | Refills: 0 | Status: COMPLETED | OUTPATIENT
Start: 2019-01-31 | End: 2019-01-31

## 2019-01-31 RX ORDER — HYDROCHLOROTHIAZIDE 25 MG
12.5 TABLET ORAL DAILY
Qty: 0 | Refills: 0 | Status: DISCONTINUED | OUTPATIENT
Start: 2019-01-31 | End: 2019-02-01

## 2019-01-31 RX ORDER — BENZOCAINE AND MENTHOL 5; 1 G/100ML; G/100ML
1 LIQUID ORAL
Qty: 0 | Refills: 0 | Status: DISCONTINUED | OUTPATIENT
Start: 2019-01-31 | End: 2019-01-31

## 2019-01-31 RX ORDER — BENZOCAINE AND MENTHOL 5; 1 G/100ML; G/100ML
1 LIQUID ORAL EVERY 6 HOURS
Qty: 0 | Refills: 0 | Status: DISCONTINUED | OUTPATIENT
Start: 2019-01-31 | End: 2019-02-01

## 2019-01-31 RX ORDER — HYDROCHLOROTHIAZIDE 25 MG
12.5 TABLET ORAL DAILY
Qty: 0 | Refills: 0 | Status: DISCONTINUED | OUTPATIENT
Start: 2019-01-31 | End: 2019-01-31

## 2019-01-31 RX ADMIN — Medication 81 MILLIGRAM(S): at 12:30

## 2019-01-31 RX ADMIN — Medication 50 MILLIGRAM(S): at 22:24

## 2019-01-31 RX ADMIN — Medication 10 MILLIGRAM(S): at 05:38

## 2019-01-31 RX ADMIN — Medication 1 MILLIGRAM(S): at 12:30

## 2019-01-31 RX ADMIN — Medication 10 MILLIGRAM(S): at 22:21

## 2019-01-31 RX ADMIN — Medication 100 MILLIGRAM(S): at 12:30

## 2019-01-31 RX ADMIN — Medication 50 MILLIGRAM(S): at 05:35

## 2019-01-31 RX ADMIN — BENZOCAINE AND MENTHOL 1 LOZENGE: 5; 1 LIQUID ORAL at 02:02

## 2019-01-31 RX ADMIN — Medication 650 MILLIGRAM(S): at 02:02

## 2019-01-31 RX ADMIN — Medication 50 MILLIGRAM(S): at 13:07

## 2019-01-31 RX ADMIN — Medication 650 MILLIGRAM(S): at 02:50

## 2019-01-31 RX ADMIN — Medication 10 MILLIGRAM(S): at 02:02

## 2019-01-31 RX ADMIN — Medication 10 MILLIGRAM(S): at 13:07

## 2019-01-31 RX ADMIN — BENZOCAINE AND MENTHOL 1 LOZENGE: 5; 1 LIQUID ORAL at 23:06

## 2019-01-31 NOTE — CHART NOTE - NSCHARTNOTEFT_GEN_A_CORE
Patient a/w CP, abnormal EKG, ETOH abuse on CIWA, Uncontrolled HTN.  Patient seen by [facility] this admission rec Echo, & Hydralazine - pt started on Hydralazine 10 mg PO tid.     BP remains uncontrolled, pt c/o sore throat & mild headache, CIWA scores 0-3 - Tylenol given for headache, Cepacol for sore throat   & Hydralazine 10 mg PO x 1 additional dose given for BP control. Patient awaiting Echo.   Patient hemodynamically stable will continue to monitor       Vital Signs Last 24 Hrs  T(C): 37.1 (31 Jan 2019 00:50), Max: 38.3 (30 Jan 2019 17:59)  T(F): 98.7 (31 Jan 2019 00:50), Max: 101 (30 Jan 2019 17:59)  HR: 103 (31 Jan 2019 00:50) (92 - 116)  BP: 169/94 (31 Jan 2019 00:50) (142/88 - 177/113)  BP(mean): --  RR: 18 (31 Jan 2019 00:50) (17 - 26)  SpO2: 98% (31 Jan 2019 00:50) (95% - 100%)      No Known Allergies      MEDICATIONS  (STANDING):  aspirin  chewable 81 milliGRAM(s) Oral daily  chlordiazePOXIDE   Oral   chlordiazePOXIDE 50 milliGRAM(s) Oral every 8 hours  folic acid 1 milliGRAM(s) Oral daily  hydrALAZINE 10 milliGRAM(s) Oral three times a day  hydrALAZINE 10 milliGRAM(s) Oral once  multivitamin/thiamine/folic acid in sodium chloride 0.9% 1000 milliLiter(s) (100 mL/Hr) IV Continuous <Continuous>  thiamine 100 milliGRAM(s) Oral daily    MEDICATIONS  (PRN):  acetaminophen   Tablet .. 650 milliGRAM(s) Oral every 6 hours PRN Temp greater or equal to 38C (100.4F), Mild Pain (1 - 3), Moderate Pain (4 - 6)  benzocaine 15 mG/menthol 3.6 mG (Sugar-Free) Lozenge 1 Lozenge Oral every 4 hours PRN Sore Throat  benzocaine 15 mG/menthol 3.6 mG Lozenge 1 Lozenge Oral four times a day PRN Sore Throat  guaiFENesin    Syrup 200 milliGRAM(s) Oral every 6 hours PRN Cough  LORazepam   Injectable 2 milliGRAM(s) IV Push every 1 hour PRN Symptom-triggered: each CIWA -Ar score 8 or GREATER

## 2019-02-01 ENCOUNTER — TRANSCRIPTION ENCOUNTER (OUTPATIENT)
Age: 34
End: 2019-02-01

## 2019-02-01 VITALS
DIASTOLIC BLOOD PRESSURE: 89 MMHG | TEMPERATURE: 98 F | RESPIRATION RATE: 18 BRPM | HEART RATE: 102 BPM | OXYGEN SATURATION: 99 % | SYSTOLIC BLOOD PRESSURE: 145 MMHG

## 2019-02-01 LAB
ANION GAP SERPL CALC-SCNC: 14 MMO/L — SIGNIFICANT CHANGE UP (ref 7–14)
BUN SERPL-MCNC: 11 MG/DL — SIGNIFICANT CHANGE UP (ref 7–23)
CALCIUM SERPL-MCNC: 9.7 MG/DL — SIGNIFICANT CHANGE UP (ref 8.4–10.5)
CHLORIDE SERPL-SCNC: 99 MMOL/L — SIGNIFICANT CHANGE UP (ref 98–107)
CO2 SERPL-SCNC: 25 MMOL/L — SIGNIFICANT CHANGE UP (ref 22–31)
CREAT SERPL-MCNC: 1.04 MG/DL — SIGNIFICANT CHANGE UP (ref 0.5–1.3)
GLUCOSE SERPL-MCNC: 99 MG/DL — SIGNIFICANT CHANGE UP (ref 70–99)
HCT VFR BLD CALC: 49.4 % — SIGNIFICANT CHANGE UP (ref 39–50)
HGB BLD-MCNC: 15.2 G/DL — SIGNIFICANT CHANGE UP (ref 13–17)
MCHC RBC-ENTMCNC: 24.9 PG — LOW (ref 27–34)
MCHC RBC-ENTMCNC: 30.8 % — LOW (ref 32–36)
MCV RBC AUTO: 81 FL — SIGNIFICANT CHANGE UP (ref 80–100)
NRBC # FLD: 0 K/UL — LOW (ref 25–125)
PLATELET # BLD AUTO: 178 K/UL — SIGNIFICANT CHANGE UP (ref 150–400)
PMV BLD: 10.9 FL — SIGNIFICANT CHANGE UP (ref 7–13)
POTASSIUM SERPL-MCNC: 3.8 MMOL/L — SIGNIFICANT CHANGE UP (ref 3.5–5.3)
POTASSIUM SERPL-SCNC: 3.8 MMOL/L — SIGNIFICANT CHANGE UP (ref 3.5–5.3)
RBC # BLD: 6.1 M/UL — HIGH (ref 4.2–5.8)
RBC # FLD: 18.4 % — HIGH (ref 10.3–14.5)
SODIUM SERPL-SCNC: 138 MMOL/L — SIGNIFICANT CHANGE UP (ref 135–145)
SPECIMEN SOURCE: SIGNIFICANT CHANGE UP
WBC # BLD: 5.89 K/UL — SIGNIFICANT CHANGE UP (ref 3.8–10.5)
WBC # FLD AUTO: 5.89 K/UL — SIGNIFICANT CHANGE UP (ref 3.8–10.5)

## 2019-02-01 PROCEDURE — 99239 HOSP IP/OBS DSCHRG MGMT >30: CPT

## 2019-02-01 RX ORDER — ASPIRIN/CALCIUM CARB/MAGNESIUM 324 MG
1 TABLET ORAL
Qty: 0 | Refills: 0 | DISCHARGE
Start: 2019-02-01

## 2019-02-01 RX ORDER — LISINOPRIL 2.5 MG/1
1 TABLET ORAL
Qty: 0 | Refills: 0 | COMMUNITY
Start: 2019-02-01

## 2019-02-01 RX ORDER — FOLIC ACID 0.8 MG
1 TABLET ORAL
Qty: 0 | Refills: 0 | DISCHARGE
Start: 2019-02-01

## 2019-02-01 RX ORDER — HYDROCHLOROTHIAZIDE 25 MG
1 TABLET ORAL
Qty: 30 | Refills: 0
Start: 2019-02-01 | End: 2019-03-02

## 2019-02-01 RX ORDER — LISINOPRIL 2.5 MG/1
1 TABLET ORAL
Qty: 30 | Refills: 0
Start: 2019-02-01 | End: 2019-03-02

## 2019-02-01 RX ORDER — THIAMINE MONONITRATE (VIT B1) 100 MG
1 TABLET ORAL
Qty: 0 | Refills: 0 | DISCHARGE
Start: 2019-02-01

## 2019-02-01 RX ORDER — LISINOPRIL 2.5 MG/1
5 TABLET ORAL DAILY
Qty: 0 | Refills: 0 | Status: DISCONTINUED | OUTPATIENT
Start: 2019-02-01 | End: 2019-02-01

## 2019-02-01 RX ADMIN — Medication 1 MILLIGRAM(S): at 12:30

## 2019-02-01 RX ADMIN — Medication 50 MILLIGRAM(S): at 16:53

## 2019-02-01 RX ADMIN — Medication 12.5 MILLIGRAM(S): at 05:51

## 2019-02-01 RX ADMIN — LISINOPRIL 5 MILLIGRAM(S): 2.5 TABLET ORAL at 12:30

## 2019-02-01 RX ADMIN — Medication 100 MILLIGRAM(S): at 12:30

## 2019-02-01 RX ADMIN — Medication 81 MILLIGRAM(S): at 12:30

## 2019-02-01 RX ADMIN — Medication 10 MILLIGRAM(S): at 05:51

## 2019-02-01 NOTE — DISCHARGE NOTE ADULT - MEDICATION SUMMARY - MEDICATIONS TO TAKE
I will START or STAY ON the medications listed below when I get home from the hospital:    aspirin 81 mg oral tablet, chewable  -- 1 tab(s) by mouth once a day  -- Indication: For Cardioprotective    lisinopril 5 mg oral tablet  -- 1 tab(s) by mouth once a day  -- Indication: For High blood pressure    hydroCHLOROthiazide 12.5 mg oral capsule  -- 1 cap(s) by mouth once a day  -- Indication: For High blood pressure    Multiple Vitamins oral tablet  -- 1 tab(s) by mouth once a day   -- Indication: For Multivitamin    thiamine 100 mg oral tablet  -- 1 tab(s) by mouth once a day  -- Indication: For Supplement    folic acid 1 mg oral tablet  -- 1 tab(s) by mouth once a day  -- Indication: For Supplement

## 2019-02-01 NOTE — DISCHARGE NOTE ADULT - PATIENT PORTAL LINK FT
You can access the [facility], offered by [facility], by registering with the following website: [email or web address]

## 2019-02-01 NOTE — PROGRESS NOTE ADULT - PROBLEM SELECTOR PLAN 4
-AG 16 -resolved  -check lactate-WNL  -librium discontinue CIWA score 0  -thiamine/MVI/folate -AG 16 -resolved  -check lactate-WNL  -librium last dose 4 PM observe and discontinue librium CIWA score 0  -thiamine/MVI/folate

## 2019-02-01 NOTE — DISCHARGE NOTE ADULT - CARE PROVIDER_API CALL
Cecil Gilliam)  Cardiovascular Disease; Nuclear Cardiology  [address] [address] [place] [address]  Phone: [phone]  Fax: [phone]    Medicine clinic at [facility],   Phone: [phone]  Fax: (   )    -

## 2019-02-01 NOTE — DISCHARGE NOTE ADULT - PLAN OF CARE
Low sodium and fat diet, continue anti-hypertensive medications (lisinopril & hydrochlorothiazide), and follow up with primary care physician. Resolution of symptoms. During your hospitalization, your work up was negative for acute coronary syndrome (heart attack).  Continue medications as prescribed.  Follow up with your PCP & cardiology within 1-2 weeks; call for appointments. Supportive measures. Continue supportive measures.  Stay well hydrated & eat a balanced diet.  Get adequate sleep.  You may take Robitussin as needed for cough.  Practice good hand hygiene & cover your mouth when you cough to prevent spread of germs.  Follow up with your PCP. Abstinence from drinking alcohol. It is highly recommended that you abstain from drinking alcohol as this habit can cause/contribute to a multitude of health conditions.  You were referred to AA by social work.  Follow up with your PCP for ongoing support. To maintain a normal blood pressure to prevent heart attack, stroke and renal failure. It is highly recommended that you abstain from drinking alcohol as this habit can cause/contribute to a multitude of health conditions.  You were referred to AA by social work.  Follow up with your PCP for ongoing support.  Continue multivitamin, thiamine & folic acid supplements.

## 2019-02-01 NOTE — PROGRESS NOTE ADULT - PROBLEM SELECTOR PLAN 3
-BP elevated ON started on hydralazine await TTE if lactate normal would start HCTZ 12.5   -monitor BP
-BP cont HCTZ/lisiopril discontinue hydralazine and uptirate ACE as tolerated  -monitor BP

## 2019-02-01 NOTE — PROGRESS NOTE ADULT - ASSESSMENT
34yo male pmh EtOH abuse, history  of hospitalization for withdrawal  presents with 10/10 sharp chest pain with radiation to LT arm and relief with nitro associates with sob, dizziness, diaphoresis found to have TWI in multiple leads and  elevated BP.
32yo male pmh EtOH abuse, history  of hospitalization for withdrawal  presents with 10/10 sharp chest pain with radiation to LT arm and relief with nitro associates with sob, dizziness, diaphoresis found to have TWI in multiple leads and  elevated BP. alcohol use with

## 2019-02-01 NOTE — PROGRESS NOTE ADULT - PROBLEM SELECTOR PLAN 1
-atypical chest pain   -CE x2 neg  -await TTE r/o LVH/WMA  -pt still tachycardic wells score PE 1.5 low will check d-dimer
-atypical chest pain   -CE x2 neg  -TTE-reviewed with cardiology hyperdynamic LV secondary to LVH   -pt still tachycardic wells score PE 1.5 low will check d-dimer-neg and tachycardia improved

## 2019-02-01 NOTE — PROGRESS NOTE ADULT - PROBLEM SELECTOR PLAN 2
-fever 101 Bcx P suspect secondary to viral illness  -symptomatic treatment antipyretic and analgesics as needed  -check UA/Ucx
-fever 101 Bcx NGTD suspect secondary to viral illness  -symptomatic treatment antipyretic and analgesics as needed  -check UA/Ucx-neg

## 2019-02-01 NOTE — DISCHARGE NOTE ADULT - PROVIDER TOKENS
TOKEN:'[id number]',FREE:[LAST:[Medicine clinic at [facility]PHONE:[phone]],FAX:[(   )    -]]

## 2019-02-01 NOTE — PROGRESS NOTE ADULT - SUBJECTIVE AND OBJECTIVE BOX
Patient is a 33y old  Male who presents with a chief complaint of Patient is a 33y old  Male who presents with a chief complaint of chest pain (2019 09:52)      SUBJECTIVE / OVERNIGHT EVENTS: no further CP no fever tele rates improved. CIWA 1-0    MEDICATIONS  (STANDING):  aspirin  chewable 81 milliGRAM(s) Oral daily  chlordiazePOXIDE   Oral   chlordiazePOXIDE 50 milliGRAM(s) Oral every 12 hours  folic acid 1 milliGRAM(s) Oral daily  hydrALAZINE 10 milliGRAM(s) Oral three times a day  hydrochlorothiazide 12.5 milliGRAM(s) Oral daily  lisinopril 5 milliGRAM(s) Oral daily  multivitamin/thiamine/folic acid in sodium chloride 0.9% 1000 milliLiter(s) (100 mL/Hr) IV Continuous <Continuous>  thiamine 100 milliGRAM(s) Oral daily    MEDICATIONS  (PRN):  acetaminophen   Tablet .. 650 milliGRAM(s) Oral every 6 hours PRN Temp greater or equal to 38C (100.4F), Mild Pain (1 - 3), Moderate Pain (4 - 6)  benzocaine 15 mG/menthol 3.6 mG (Sugar-Free) Lozenge 1 Lozenge Oral every 6 hours PRN Sore Throat  guaiFENesin    Syrup 200 milliGRAM(s) Oral every 6 hours PRN Cough  LORazepam   Injectable 2 milliGRAM(s) IV Push every 1 hour PRN Symptom-triggered: each CIWA -Ar score 8 or GREATER        CAPILLARY BLOOD GLUCOSE        I&O's Summary    2019 07:01  -  2019 07:00  --------------------------------------------------------  IN: 350 mL / OUT: 1250 mL / NET: -900 mL        T(C): 36.7 (19 @ 09:42), Max: 37.2 (19 @ 12:28)  HR: 98 (19 @ 09:42) (71 - 115)  BP: 140/90 (19 @ 09:42) (140/90 - 155/109)  RR: 18 (19 @ 09:42) (17 - 18)  SpO2: 97% (19 @ 09:42) (97% - 100%)    PHYSICAL EXAM:  GENERAL: NAD, well-developed  HEAD:  Atraumatic, Normocephalic  EYES: EOMI, PERRLA, conjunctiva and sclera clear  NECK: Supple, No JVD  CHEST/LUNG: Clear to auscultation bilaterally; No wheeze  HEART: Regular rate and rhythm; No murmurs, rubs, or gallops  ABDOMEN: Soft, Nontender, Nondistended; Bowel sounds present  EXTREMITIES:  2+ Peripheral Pulses, No clubbing, cyanosis, or edema  PSYCH: AAOx3  NEUROLOGY: non-focal  SKIN: No rashes or lesions    LABS:                        15.2   5.89  )-----------( 178      ( 2019 06:20 )             49.4         138  |  99  |  11  ----------------------------<  99  3.8   |  25  |  1.04    Ca    9.7      2019 06:20  Phos  3.8       Mg     2.1             CARDIAC MARKERS ( 2019 06:40 )  x     / x     / 386 u/L / x     / x      CARDIAC MARKERS ( 2019 12:03 )  x     / x     / 458 u/L / 1.77 ng/mL / x          Urinalysis Basic - ( 2019 11:15 )    Color: YELLOW / Appearance: CLEAR / S.019 / pH: 6.0  Gluc: NEGATIVE / Ketone: SMALL  / Bili: NEGATIVE / Urobili: NORMAL   Blood: NEGATIVE / Protein: 20 / Nitrite: NEGATIVE   Leuk Esterase: NEGATIVE / RBC: 0-2 / WBC 0-2   Sq Epi: OCC / Non Sq Epi: x / Bacteria: NEGATIVE          RADIOLOGY & ADDITIONAL TESTS:    Imaging Personally Reviewed:< from: Transthoracic Echocardiogram (19 @ 20:34) >  CONCLUSIONS:  1. Increased relative wall thickness with normal left  ventricular mass index, consistent with concentric left  ventricular remodeling.  2. Hyperdynamic left ventricle.  3. Normal left ventricular diastolic function.  4. Normal right ventricular size and function.  5. Estimated right ventricular systolic pressure equals 16  mm Hg, assuming right atrial pressure equals 10 mm Hg,  consistent with normal pulmonary pressures.  *** No previous Echo exam.    < end of copied text >      Consultant(s) Notes Reviewed:      Care Discussed with Consultants/Other Providers:
Patient is a 33y old  Male who presents with a chief complaint of Patient is a 33y old  Male who presents with a chief complaint of chest pain (30 Jan 2019 02:37)      SUBJECTIVE / OVERNIGHT EVENTS: yesterday evening temp 101 BP remains uncontrolled, pt c/o sore throat & mild headache, CIWA scores 1-3 - Tylenol given for headache, Cepacol for sore throat. ST on tele      MEDICATIONS  (STANDING):  aspirin  chewable 81 milliGRAM(s) Oral daily  chlordiazePOXIDE   Oral   chlordiazePOXIDE 50 milliGRAM(s) Oral every 8 hours  folic acid 1 milliGRAM(s) Oral daily  hydrALAZINE 10 milliGRAM(s) Oral three times a day  multivitamin/thiamine/folic acid in sodium chloride 0.9% 1000 milliLiter(s) (100 mL/Hr) IV Continuous <Continuous>  thiamine 100 milliGRAM(s) Oral daily    MEDICATIONS  (PRN):  acetaminophen   Tablet .. 650 milliGRAM(s) Oral every 6 hours PRN Temp greater or equal to 38C (100.4F), Mild Pain (1 - 3), Moderate Pain (4 - 6)  benzocaine 15 mG/menthol 3.6 mG (Sugar-Free) Lozenge 1 Lozenge Oral every 6 hours PRN Sore Throat  guaiFENesin    Syrup 200 milliGRAM(s) Oral every 6 hours PRN Cough  LORazepam   Injectable 2 milliGRAM(s) IV Push every 1 hour PRN Symptom-triggered: each CIWA -Ar score 8 or GREATER        CAPILLARY BLOOD GLUCOSE        I&O's Summary    30 Jan 2019 07:01  -  31 Jan 2019 07:00  --------------------------------------------------------  IN: 0 mL / OUT: 800 mL / NET: -800 mL    31 Jan 2019 07:01  -  31 Jan 2019 09:52  --------------------------------------------------------  IN: 0 mL / OUT: 500 mL / NET: -500 mL        T(C): 37.2 (01-31-19 @ 08:28), Max: 38.3 (01-30-19 @ 17:59)  HR: 95 (01-31-19 @ 08:28) (95 - 116)  BP: 160/111 (01-31-19 @ 08:28) (148/88 - 177/113)  RR: 17 (01-31-19 @ 08:28) (17 - 18)  SpO2: 99% (01-31-19 @ 08:28) (95% - 100%)    PHYSICAL EXAM:  GENERAL: NAD, well-developed  HEAD:  Atraumatic, Normocephalic  EYES: EOMI, PERRLA, conjunctiva and sclera clear  NECK: Supple, No JVD  CHEST/LUNG: Clear to auscultation bilaterally; No wheeze  HEART: Regular rate and rhythm; No murmurs, rubs, or gallops  ABDOMEN: Soft, Nontender, Nondistended; Bowel sounds present  EXTREMITIES:  2+ Peripheral Pulses, No clubbing, cyanosis, or edema  PSYCH: AAOx3  NEUROLOGY: non-focal  SKIN: No rashes or lesions    LABS:                        15.5   5.19  )-----------( 178      ( 31 Jan 2019 06:40 )             50.1     01-31    140  |  99  |  9   ----------------------------<  91  3.8   |  25  |  1.06    Ca    9.4      31 Jan 2019 06:40  Phos  3.8     01-31  Mg     2.1     01-31    TPro  7.2  /  Alb  4.2  /  TBili  0.8  /  DBili  x   /  AST  24  /  ALT  14  /  AlkPhos  74  01-29    PT/INR - ( 30 Jan 2019 05:20 )   PT: 15.0 SEC;   INR: 1.34          PTT - ( 30 Jan 2019 01:31 )  PTT:31.4 SEC  CARDIAC MARKERS ( 31 Jan 2019 06:40 )  x     / x     / 386 u/L / x     / x      CARDIAC MARKERS ( 30 Jan 2019 12:03 )  x     / x     / 458 u/L / 1.77 ng/mL / x                RADIOLOGY & ADDITIONAL TESTS:    Imaging Personally Reviewed:    Consultant(s) Notes Reviewed:      Care Discussed with Consultants/Other Providers:

## 2019-02-01 NOTE — DISCHARGE NOTE ADULT - CARE PLAN
Principal Discharge DX:	Atypical chest pain  Goal:	Resolution of symptoms.  Assessment and plan of treatment:	During your hospitalization, your work up was negative for acute coronary syndrome (heart attack).  Continue medications as prescribed.  Follow up with your PCP & cardiology within 1-2 weeks; call for appointments.  Secondary Diagnosis:	Coronavirus infection  Goal:	Supportive measures.  Assessment and plan of treatment:	Continue supportive measures.  Stay well hydrated & eat a balanced diet.  Get adequate sleep.  You may take Robitussin as needed for cough.  Practice good hand hygiene & cover your mouth when you cough to prevent spread of germs.  Follow up with your PCP.  Secondary Diagnosis:	ETOH abuse  Goal:	Abstinence from drinking alcohol.  Assessment and plan of treatment:	It is highly recommended that you abstain from drinking alcohol as this habit can cause/contribute to a multitude of health conditions.  You were referred to AA by social work.  Follow up with your PCP for ongoing support.  Secondary Diagnosis:	HTN (hypertension)  Goal:	To maintain a normal blood pressure to prevent heart attack, stroke and renal failure.  Assessment and plan of treatment:	Low sodium and fat diet, continue anti-hypertensive medications (lisinopril & hydrochlorothiazide), and follow up with primary care physician. Principal Discharge DX:	Atypical chest pain  Goal:	Resolution of symptoms.  Assessment and plan of treatment:	During your hospitalization, your work up was negative for acute coronary syndrome (heart attack).  Continue medications as prescribed.  Follow up with your PCP & cardiology within 1-2 weeks; call for appointments.  Secondary Diagnosis:	Coronavirus infection  Goal:	Supportive measures.  Assessment and plan of treatment:	Continue supportive measures.  Stay well hydrated & eat a balanced diet.  Get adequate sleep.  You may take Robitussin as needed for cough.  Practice good hand hygiene & cover your mouth when you cough to prevent spread of germs.  Follow up with your PCP.  Secondary Diagnosis:	ETOH abuse  Goal:	Abstinence from drinking alcohol.  Assessment and plan of treatment:	It is highly recommended that you abstain from drinking alcohol as this habit can cause/contribute to a multitude of health conditions.  You were referred to AA by social work.  Follow up with your PCP for ongoing support.  Continue multivitamin, thiamine & folic acid supplements.  Secondary Diagnosis:	HTN (hypertension)  Goal:	To maintain a normal blood pressure to prevent heart attack, stroke and renal failure.  Assessment and plan of treatment:	Low sodium and fat diet, continue anti-hypertensive medications (lisinopril & hydrochlorothiazide), and follow up with primary care physician.

## 2019-02-01 NOTE — DISCHARGE NOTE ADULT - HOSPITAL COURSE
HPI: 34 y/o M w/ PMHx HTN (not on meds), EtOH abuse (hx of withdrawal, no seizure), active smoker (4 cig/day) presenting with acute left sided CP. Pt states pain started on Tuesday while he was working, around 5 pm, pain was left sided chest pain, radiating to the left arm, associated with numbness. Pain lasted about 20 minutes, and was not worsening with palpation, non pleuritic. Since that time pain has been intermittent, only improved with sublingual nitroglycerin given by EMS. Pt also got  by EMS. Associated symptoms include: SOB, sweating, cough, diarrhea, vomiting x 1. No family history of heart attacks, sudden death. Denies: syncope, nausea, sick contacts. Never had chest pain like this before. Pt has a history of EtOH abuse, last drank on Sunday, Monday about 2-3 pints of Vodka, last drink before that was 2 weeks prior. Has history of withdrawal, never been to the ICU, never had seizures. Last time he had withdrawal like symptoms was a few weeks ago.    On admission:  EKG: Sinus Tach @ 114, T inv I, II, AVL, V4-6  CE x 3 (hsTrop:10--> 12-->9; CK: 458)                           RVP + coronovirus                              Tox screen neg                     1/29 CXR - clear lungs  UA negative   1/31/19- Echo  Increased relative wall thickness with normal left ventricular mass index, consistent with concentric left ventricular remodeling.  2. Hyperdynamic left ventricle.  3. Normal left ventricular diastolic function.  4. Normal right ventricular size and function.  5. Estimated right ventricular systolic pressure equals 16 mm Hg, assuming right atrial pressure equals 10 mm Hg, consistent with normal pulmonary pressures.  *** No previous Echo exam.    Evaluated by cardiology: 32yo male pmh EtOH abuse, history  of hospitalization for withdrawal  presents with 10/10 sharp chest pain associates with sob, dizziness, diaphoresis found to have TWI in multiple leads with LVH criteria also with elevated BP.  Needs to stop drinking (ENE provided resource to AA).  LVH noted on echo: started on lisinopril & HCTZ.  Outpatient cardiology f/u.     Evaluated by medicine: 32yo male pmh EtOH abuse, history  of hospitalization for withdrawal  presents with 10/10 sharp chest pain with radiation to LT arm and relief with nitro associates with sob, dizziness, diaphoresis found to have TWI in multiple leads and  elevated BP.  -Chest pain: -atypical chest pain -CE x2 neg -TTE-reviewed with cardiology hyperdynamic LV secondary to LVH -pt still tachycardic wells score PE 1.5 low. d-dimer-neg and tachycardia improved.   -Coronavirus infection: -fever 101 Bcx NGTD suspect secondary to viral illness -symptomatic treatment antipyretic and analgesics as needed -UA negative  -Hypertension: -BP cont HCTZ/lisiopril discontinue hydralazine and uptirate ACE as tolerated  -Alcohol withdrawal syndrome with complication: -AG 16 -resolved -lactate-WNL -librium last dose 4 PM 2/1. observe and discontinue librium CIWA score 0 -thiamine/MVI/folate.    Patient cleared for discharge home.  Outpatient f/u with medicine & cardiology.  Encouraged by SW to f/u at .

## 2019-02-01 NOTE — DISCHARGE NOTE ADULT - ADDITIONAL INSTRUCTIONS
Follow up with your PCP & cardiology within 1-2 weeks; call for appointments.  Cardiology clinic at [facility]: [phone]  Medicine clinic at [facility]: [phone]

## 2019-02-04 LAB — BACTERIA BLD CULT: SIGNIFICANT CHANGE UP

## 2019-02-05 LAB — BACTERIA BLD CULT: SIGNIFICANT CHANGE UP

## 2019-04-04 NOTE — PATIENT PROFILE ADULT - PRO INTERPRETER NEED 2
English [FreeTextEntry1] : PSAL form completed and given to pt\par reviewed test results \par HDL low at 39 mg/dl\par discussed. diet and exercise.  starting baseball and will be exercising more.  Syas he has stoped drinking soda and juiced,  Has replaced sugary drinks with water.\par discussed portion size and increasing vegetables and fruit\par not interested in continuing the healthy life style plan.  Feels he can work on his own plan\par advised can come back for nutritional counseling at any time\par copy lipid profile, ALT, HGBA1C given to pt to take to parent

## 2020-02-12 ENCOUNTER — EMERGENCY (EMERGENCY)
Facility: HOSPITAL | Age: 35
LOS: 1 days | Discharge: HOME | End: 2020-02-12
Admitting: STUDENT IN AN ORGANIZED HEALTH CARE EDUCATION/TRAINING PROGRAM
Payer: SELF-PAY

## 2020-02-12 VITALS
TEMPERATURE: 100 F | DIASTOLIC BLOOD PRESSURE: 91 MMHG | OXYGEN SATURATION: 98 % | SYSTOLIC BLOOD PRESSURE: 177 MMHG | HEART RATE: 93 BPM | RESPIRATION RATE: 15 BRPM

## 2020-02-12 PROCEDURE — 99053 MED SERV 10PM-8AM 24 HR FAC: CPT

## 2020-02-12 PROCEDURE — 99283 EMERGENCY DEPT VISIT LOW MDM: CPT

## 2020-02-12 NOTE — ED ADULT TRIAGE NOTE - CHIEF COMPLAINT QUOTE
Pt c/o raspy voice for the past five days.  Pt states had a cough, but states has since resolved.  PMHx:  HTN

## 2020-02-13 PROBLEM — I10 ESSENTIAL (PRIMARY) HYPERTENSION: Chronic | Status: ACTIVE | Noted: 2019-01-30

## 2020-02-13 PROBLEM — F10.10 ALCOHOL ABUSE, UNCOMPLICATED: Chronic | Status: ACTIVE | Noted: 2019-01-29

## 2020-02-13 PROCEDURE — 71046 X-RAY EXAM CHEST 2 VIEWS: CPT | Mod: 26

## 2020-02-13 NOTE — ED PROVIDER NOTE - PATIENT PORTAL LINK FT
You can access the FollowMyHealth Patient Portal offered by [facility] by registering at the following website: [email or web address]. By joining [facility]’s FollowMyHealth portal, you will also be able to view your health information using other applications (apps) compatible with our system.

## 2020-02-13 NOTE — ED PROVIDER NOTE - OBJECTIVE STATEMENT
33 y/o male with PMHx of HTN presenting to ED with for a raspy voice x 5 days. No fevers, nausea or vomiting. +cough. No other acute complaints at time of eval. 33 y/o male with PMHx of HTN presenting to ED with for a raspy voice + dry cough x 5 days. denies any headaches, neck pain, visual disturbances, fevers, chills, n/v/d, chest pain, sob, urinary symptoms, numbness/weakness/tingling, recent travel, sick contact, social hsitory

## 2021-05-17 NOTE — ED ADULT NURSE NOTE - NSSISCREENINGQ1_ED_A_ED
Doxycycline Pregnancy And Lactation Text: This medication is Pregnancy Category D and not consider safe during pregnancy. It is also excreted in breast milk but is considered safe for shorter treatment courses. Birth Control Pills Pregnancy And Lactation Text: This medication should be avoided if pregnant and for the first 30 days post-partum. Topical Retinoid counseling:  Patient advised to apply a pea-sized amount only at bedtime and wait 30 minutes after washing their face before applying.  If too drying, patient may add a non-comedogenic moisturizer. The patient verbalized understanding of the proper use and possible adverse effects of retinoids.  All of the patient's questions and concerns were addressed. Minocycline Pregnancy And Lactation Text: This medication is Pregnancy Category D and not consider safe during pregnancy. It is also excreted in breast milk. Isotretinoin Pregnancy And Lactation Text: This medication is Pregnancy Category X and is considered extremely dangerous during pregnancy. It is unknown if it is excreted in breast milk. Topical Clindamycin Counseling: Patient counseled that this medication may cause skin irritation or allergic reactions.  In the event of skin irritation, the patient was advised to reduce the amount of the drug applied or use it less frequently.   The patient verbalized understanding of the proper use and possible adverse effects of clindamycin.  All of the patient's questions and concerns were addressed. Spironolactone Pregnancy And Lactation Text: This medication can cause feminization of the male fetus and should be avoided during pregnancy. The active metabolite is also found in breast milk. Benzoyl Peroxide Pregnancy And Lactation Text: This medication is Pregnancy Category C. It is unknown if benzoyl peroxide is excreted in breast milk. Birth Control Pills Counseling: Birth Control Pill Counseling: I discussed with the patient the potential side effects of OCPs including but not limited to increased risk of stroke, heart attack, thrombophlebitis, deep venous thrombosis, hepatic adenomas, breast changes, GI upset, headaches, and depression.  The patient verbalized understanding of the proper use and possible adverse effects of OCPs. All of the patient's questions and concerns were addressed. Azithromycin Counseling:  I discussed with the patient the risks of azithromycin including but not limited to GI upset, allergic reaction, drug rash, diarrhea, and yeast infections. Tazorac Pregnancy And Lactation Text: This medication is not safe during pregnancy. It is unknown if this medication is excreted in breast milk. Isotretinoin Counseling: Patient should get monthly blood tests, not donate blood, not drive at night if vision affected, not share medication, and not undergo elective surgery for 6 months after tx completed. Side effects reviewed, pt to contact office should one occur. Doxycycline Counseling:  Patient counseled regarding possible photosensitivity and increased risk for sunburn.  Patient instructed to avoid sunlight, if possible.  When exposed to sunlight, patients should wear protective clothing, sunglasses, and sunscreen.  The patient was instructed to call the office immediately if the following severe adverse effects occur:  hearing changes, easy bruising/bleeding, severe headache, or vision changes.  The patient verbalized understanding of the proper use and possible adverse effects of doxycycline.  All of the patient's questions and concerns were addressed. Topical Sulfur Applications Pregnancy And Lactation Text: This medication is Pregnancy Category C and has an unknown safety profile during pregnancy. It is unknown if this topical medication is excreted in breast milk. Minocycline Counseling: Patient advised regarding possible photosensitivity and discoloration of the teeth, skin, lips, tongue and gums.  Patient instructed to avoid sunlight, if possible.  When exposed to sunlight, patients should wear protective clothing, sunglasses, and sunscreen.  The patient was instructed to call the office immediately if the following severe adverse effects occur:  hearing changes, easy bruising/bleeding, severe headache, or vision changes.  The patient verbalized understanding of the proper use and possible adverse effects of minocycline.  All of the patient's questions and concerns were addressed. Spironolactone Counseling: Patient advised regarding risks of diarrhea, abdominal pain, hyperkalemia, birth defects (for female patients), liver toxicity and renal toxicity. The patient may need blood work to monitor liver and kidney function and potassium levels while on therapy. The patient verbalized understanding of the proper use and possible adverse effects of spironolactone.  All of the patient's questions and concerns were addressed. Dapsone Pregnancy And Lactation Text: This medication is Pregnancy Category C and is not considered safe during pregnancy or breast feeding. Detail Level: Zone Benzoyl Peroxide Counseling: Patient counseled that medicine may cause skin irritation and bleach clothing.  In the event of skin irritation, the patient was advised to reduce the amount of the drug applied or use it less frequently.   The patient verbalized understanding of the proper use and possible adverse effects of benzoyl peroxide.  All of the patient's questions and concerns were addressed. Bactrim Pregnancy And Lactation Text: This medication is Pregnancy Category D and is known to cause fetal risk.  It is also excreted in breast milk. High Dose Vitamin A Pregnancy And Lactation Text: High dose vitamin A therapy is contraindicated during pregnancy and breast feeding. Tazorac Counseling:  Patient advised that medication is irritating and drying.  Patient may need to apply sparingly and wash off after an hour before eventually leaving it on overnight.  The patient verbalized understanding of the proper use and possible adverse effects of tazorac.  All of the patient's questions and concerns were addressed. Erythromycin Pregnancy And Lactation Text: This medication is Pregnancy Category B and is considered safe during pregnancy. It is also excreted in breast milk. No Include Pregnancy/Lactation Warning?: No Topical Sulfur Applications Counseling: Topical Sulfur Counseling: Patient counseled that this medication may cause skin irritation or allergic reactions.  In the event of skin irritation, the patient was advised to reduce the amount of the drug applied or use it less frequently.   The patient verbalized understanding of the proper use and possible adverse effects of topical sulfur application.  All of the patient's questions and concerns were addressed. Dapsone Counseling: I discussed with the patient the risks of dapsone including but not limited to hemolytic anemia, agranulocytosis, rashes, methemoglobinemia, kidney failure, peripheral neuropathy, headaches, GI upset, and liver toxicity.  Patients who start dapsone require monitoring including baseline LFTs and weekly CBCs for the first month, then every month thereafter.  The patient verbalized understanding of the proper use and possible adverse effects of dapsone.  All of the patient's questions and concerns were addressed. Bactrim Counseling:  I discussed with the patient the risks of sulfa antibiotics including but not limited to GI upset, allergic reaction, drug rash, diarrhea, dizziness, photosensitivity, and yeast infections.  Rarely, more serious reactions can occur including but not limited to aplastic anemia, agranulocytosis, methemoglobinemia, blood dyscrasias, liver or kidney failure, lung infiltrates or desquamative/blistering drug rashes. Topical Retinoid Pregnancy And Lactation Text: This medication is Pregnancy Category C. It is unknown if this medication is excreted in breast milk. Erythromycin Counseling:  I discussed with the patient the risks of erythromycin including but not limited to GI upset, allergic reaction, drug rash, diarrhea, increase in liver enzymes, and yeast infections. Topical Clindamycin Pregnancy And Lactation Text: This medication is Pregnancy Category B and is considered safe during pregnancy. It is unknown if it is excreted in breast milk. Sarecycline Counseling: Patient advised regarding possible photosensitivity and discoloration of the teeth, skin, lips, tongue and gums.  Patient instructed to avoid sunlight, if possible.  When exposed to sunlight, patients should wear protective clothing, sunglasses, and sunscreen.  The patient was instructed to call the office immediately if the following severe adverse effects occur:  hearing changes, easy bruising/bleeding, severe headache, or vision changes.  The patient verbalized understanding of the proper use and possible adverse effects of sarecycline.  All of the patient's questions and concerns were addressed. High Dose Vitamin A Counseling: Side effects reviewed, pt to contact office should one occur. Tetracycline Counseling: Patient counseled regarding possible photosensitivity and increased risk for sunburn.  Patient instructed to avoid sunlight, if possible.  When exposed to sunlight, patients should wear protective clothing, sunglasses, and sunscreen.  The patient was instructed to call the office immediately if the following severe adverse effects occur:  hearing changes, easy bruising/bleeding, severe headache, or vision changes.  The patient verbalized understanding of the proper use and possible adverse effects of tetracycline.  All of the patient's questions and concerns were addressed. Patient understands to avoid pregnancy while on therapy due to potential birth defects. Azithromycin Pregnancy And Lactation Text: This medication is considered safe during pregnancy and is also secreted in breast milk.

## 2023-02-07 NOTE — ED PROVIDER NOTE - CPE EDP ENMT NORM
Pt is concerned that she is having a multiple system reaction to her lamictal. Headache  6/10. Sunday night was the last time she took lamictal. Pt states her lymph nodes are swollen, she has a rash on her back, and a headache.   
normal...

## 2023-04-12 NOTE — H&P ADULT - NSHPSOCIALHISTORY_GEN_ALL_CORE
PATRICIA AMBULATORY ENCOUNTER  OFFICE VISIT      SUBJECTIVE:    Ann B Marsh Meigs is a 84 year old       seen today for continued vulvar irritation.     In review,   Starting back on  she noted vulvar irritation on the right labia majora superior aspect. She tried hydrocortisone cream with some relief. On 3/17/23 she presented to her PCP for evaluation given worsening of symptoms and a new red spot at the area of irritation. There was no obvious lesion or area of redness seen by the provider, but they noted a firm possible cyst at the superior aspect of the labia. She noted some urinary frequency and a urinalysis was performed that showed no UTI. She started sitz baths after initially contacting our office with some relief. She denies vaginal discharge/odor, post menopausal bleeding. Denies history of prolapse repair, bladder sling, etc... Reports that after she urinates she does not feel fully empty and that she needs to wait and then is able to fully empty, this is relatively new. She does not use pads or panty liners. She has no new soaps or detergents. She does not use wipes.     On 3/29/23 we sent BV and yeast cultures, both were negative. I started her on Diflucan (4 doses total) and estrogen cream. She has completed 3/4 Diflucan doses and 7 days of estrogen cream. She is still doing sitz baths and using aquaphor prn. She is applying nothing else. She feels her symptoms are worsening. She feels the labia are more warm and swollen.      PAST GYNECOLOGIC HISTORY:  Menopausal  Last pap smear: 11 Negative             Patient reports Pap smears have been normal.  Last Mammogram: 22 Bi Rad 2 benign  Last Colonoscopy: 18, tubular adenoma, 5 year follow up  Last DEXA: 21 Osteopenia    PAST OBSTETRICAL HISTORY:  OB History    Para Term  AB Living   4 3 3 0 1 3   SAB IAB Ectopic Molar Multiple Live Births   0 0 0 0 0 3      # Outcome Date GA Lbr Lyle/2nd Weight Sex Delivery  Anes PTL Lv   4 AB 1971           3 Term 1965 40w0d   M Vag-Spont   ИРИНА   2 Term 1964 40w0d   M    ИРИНА   1 Term 1962 40w0d   F    ИРИНА        PAST HISTORIES:  Allergies, Medications, Medical History, Surgical History, Social History and Family History were reviewed and updated.    REVIEW OF SYSTEMS:    General:  Denies fevers or chills.    OBJECTIVE:  PHYSICAL EXAM:    Vital Signs:   Visit Vitals  BP (!) 140/70   Ht 5' 4\" (1.626 m)   Wt 64.1 kg (141 lb 6.4 oz)   BMI 24.27 kg/m²      General Appearance: Well groomed.   Neuropsychiatric: Mood and affect appropriate.    Respiratory: Respiratory effort normal.   Pelvic:  External genitalia: Bilateral labia majora and minora are warm, erythematous and mildly swollen. There are no lesions. There are no abscesses. There is no abnormal vaginal discharge.   Urethral Meatus: Normal.  Urethra: No masses or tenderness.  Bladder: No cystocele. Non-tender  Vagina:   • Hypopigmented due to hypoestrogenism (improved from prior)  • On the anterior wall of the vagina (if you find the cervix and then pull back the speculum about 3cm you can see it) there is a 1cm circular lesion that is hypopigmented it is not friable or painful, it is not raised, it is smooth (improved from prior)  • Just inferior to the urethra bilaterally there are 1x2cm masses that soft, non tender (unchanged)  Cervix: Normal appearance.  No discharge.     Creatinine (mg/dL)   Date Value   12/08/2022 0.63        ASSESSMENT/PLAN:    1. Vulvar cellulitis  - I reviewed with the patient that her cultures last time for BV and yeast were negative. She has seen no improvement after 3 doses of Diflucan. While her vaginal tissue appears healthier since the 7 days of estrogen cream or labial irritation is only worse. I would like her to stop the estrogen cream for now, just in case it is further irritating her labia. I suspect she may have vulvar cellulitis. I reviewed the option of vulvar biopsy now or in 1 week if no  improvement from the bactrim. She would like to first try the Bactrim. She will continue aquaphor prn and sitz baths. We will repeat UA and culture just to be thorough, but she has no dysuria.   - sulfamethoxazole-trimethoprim (Bactrim DS) 800-160 MG per tablet; Take 1 tablet by mouth in the morning and 1 tablet in the evening. Do all this for 7 days.  Dispense: 14 tablet; Refill: 0  - Urine, Bacterial Culture; Future  - Urinalysis With Microscopy & Culture If Indicated; Future    Exam performed with chaperone Megan Helms RN     Return in about 1 week (around 4/19/2023) for follow up vulvar symptoms.    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 Has history of withdrawal, never been to the ICU, never had seizures. Last time he had withdrawal like symptoms was a few weeks ago.  Smokes 4 cigarettes a day for 9 years  Rare marijuana use, no other illicit drugs

## 2023-10-07 ENCOUNTER — EMERGENCY (EMERGENCY)
Facility: HOSPITAL | Age: 38
LOS: 1 days | Discharge: HOME | End: 2023-10-07
Attending: EMERGENCY MEDICINE | Admitting: EMERGENCY MEDICINE
Payer: MEDICAID

## 2023-10-07 VITALS
OXYGEN SATURATION: 94 % | RESPIRATION RATE: 16 BRPM | SYSTOLIC BLOOD PRESSURE: 150 MMHG | HEART RATE: 118 BPM | TEMPERATURE: 98 F | DIASTOLIC BLOOD PRESSURE: 94 MMHG

## 2023-10-07 PROCEDURE — 99284 EMERGENCY DEPT VISIT MOD MDM: CPT

## 2023-10-07 PROCEDURE — 93010 ELECTROCARDIOGRAM REPORT: CPT

## 2023-10-07 RX ORDER — THIAMINE MONONITRATE (VIT B1) 100 MG
100 TABLET ORAL ONCE
Refills: 0 | Status: COMPLETED | OUTPATIENT
Start: 2023-10-07 | End: 2023-10-07

## 2023-10-07 RX ADMIN — Medication 100 MILLIGRAM(S): at 23:35

## 2023-10-07 NOTE — ED PROVIDER NOTE - ATTENDING CONTRIBUTION TO CARE
Zach: I have seen and examined the patient face to face, have reviewed and addended the HPI, PE and a/p as necessary.     37 yo M with HTN on no medication, ETOH dependence a/w 2 weeks of worsening bilateral LE edema.  Reports pain in the ankles worsens throughout the day when ambulating and when he works on his feet at [employer].  Pt reports pain worsens with walking.  Noted to have no fevers, chills, chest pain, shortness of breath, nausea, vomiting, abdominal pain.  Pt reports drinking vodka daily,  Patient kraft had history of withdrawal symptoms including palpitations, tremors, chest pain, anxiety, vomiting and nausea. Denies history of seizures.      GEN - NAD; well appearing; A+O x3; non-toxic appearing4  UNGS: CTAB, no wheezes or crackles   CARDIAC: RRR, no m/r/g  ABDOMEN: Soft, non tender, non distended, no rebound, no guarding  BACK: No midline spinal tenderness  EXT: Bilateral lower extremity pitting edema, no calf tenderness, 2+ PT pulses bilaterally no deformities.  NEURO - moving all ext aaox3, non-focal exam,       37 yo M with HTN on no medication, ETOH dependence a/w 2 weeks of worsening bilateral LE edema.  LIkely dependent edema given on feet all day, will r/o CHF, TUHY as cause of LE edema.  Will eval for cardiac versus renal etiology versus in setting of alcohol abuse.  Will most likely discharge.  Will give Librium to avoid withdrawal

## 2023-10-07 NOTE — ED PROVIDER NOTE - NSFOLLOWUPCLINICS_GEN_ALL_ED_FT
[facility] General Internal Medicine  General Internal Medicine  [address] [place] [address]  Phone: [phone]  Fax:

## 2023-10-07 NOTE — ED PROVIDER NOTE - NSDCPRINTRESULTS_ED_ALL_ED
Patient requests all Lab, Cardiology, and Radiology Results on their Discharge Instructions H/O bilateral breast biopsy  November 2018 and December 2018  H/O total thyroidectomy    S/P angioplasty with stent  drug eluting stent in first diagonal on 2/21/19  S/P dilation and curettage  TOP x2

## 2023-10-07 NOTE — ED ADULT NURSE NOTE - OBJECTIVE STATEMENT
Patient received rm 4, a&ox4, ambulatory. Pt reports bilateral lower extremity swelling and pain to ankles x1 week. Pt states he works for [employer] and is on his feet all day. hx HTN, ETOH abuse. Pt denies chest pain, sob, n+v, headache, dizziness, fever, chills. Breathing even, unlabored; O2 sat 88% on RA, MD Flaherty aware. Pt placed on 2L NC, O2 96%. Pt endorses drinking vodka daily "is unsure of amount." 20g IV placed to right ac, labs collected and sent. Pt medicated as per MAR. Safety maintained.

## 2023-10-07 NOTE — ED PROVIDER NOTE - NSFOLLOWUPINSTRUCTIONS_ED_ALL_ED_FT
You were seen for bilateral leg swelling. Please find your results attached.     Leg swelling most likely in setting of alcohol use. Please follow up with a primary care doctor for further management.     Important to stay hydrated.     Please return to the ED if you experience shortness of breath, chest pain, withdrawal symptoms, or any other concerns.

## 2023-10-07 NOTE — ED ADULT NURSE NOTE - NSFALLUNIVINTERV_ED_ALL_ED
Bed/Stretcher in lowest position, wheels locked, appropriate side rails in place/Call bell, personal items and telephone in reach/Instruct patient to call for assistance before getting out of bed/chair/stretcher/Non-slip footwear applied when patient is off stretcher/[place] to call system/Physically safe environment - no spills, clutter or unnecessary equipment/Purposeful proactive rounding/Room/bathroom lighting operational, light cord in reach

## 2023-10-07 NOTE — ED PROVIDER NOTE - OBJECTIVE STATEMENT
38-year-old male patient past medical history of high blood pressure not on any medication, alcohol abuse and history of alcohol withdrawal who presents to the emergency department for 2 weeks of constant bilateral lower extremity edema.  Patient drinks every day, and last drink was today couple hours prior to arrival.  Patient tends to drink vodka or hard liquor.  Patient has had history of withdrawal symptoms including palpitations, tremors, chest pain, anxiety, vomiting and nausea.  Denies history of seizures, but has been admitted in the past for alcohol withdrawal.  Patient denies any calf pain, but does endorse some ankle discomfort and pain when walking.  Edema worsens with walking/ambulation.  Denies any current chest pain, shortness of breath, nausea, vomiting, abdominal pain.

## 2023-10-07 NOTE — ED PROVIDER NOTE - PHYSICAL EXAMINATION
GENERAL: Awake, alert, NAD  HEENT: NC/AT, moist mucous membranes, EOMI  LUNGS: CTAB, no wheezes or crackles   CARDIAC: RRR, no m/r/g  ABDOMEN: Soft, non tender, non distended, no rebound, no guarding  BACK: No midline spinal tenderness  EXT: Bilateral lower extremity pitting edema, no calf tenderness, 2+ PT pulses bilaterally no deformities.  NEURO: A&Ox3. Moving all extremities.  SKIN: Warm and dry. No rash.  PSYCH: Normal affect.

## 2023-10-07 NOTE — ED PROVIDER NOTE - CLINICAL SUMMARY MEDICAL DECISION MAKING FREE TEXT BOX
38-year-old male patient past medical history of alcohol abuse and withdrawal, high blood pressure who presents to the emergency department for 2 weeks of worsening bilateral lower extremity edema.  Patient drinks alcohol daily, and last drink was couple hours prior to arrival.  Denies current chest pain, shortness of breath, palpitations, tremors.  On exam, found with bilateral pitting edema.  Will eval for cardiac versus renal etiology versus in setting of alcohol abuse.  Will most likely discharge.  Will give Librium to avoid withdrawal.

## 2023-10-07 NOTE — ED ADULT NURSE NOTE - HIV OFFER
Patient notified. Patient was due for INR recheck on 08/17/23 .  Upon return call, please schedule patient in ACC or have patient present to lab for INR recheck.  Lab order already in place.     Opt out

## 2023-10-07 NOTE — ED PROVIDER NOTE - PATIENT PORTAL LINK FT
You can access the FollowMyHealth Patient Portal offered by [facility] by registering at the following website: [email or web address]. By joining [facility]’s FollowMyHealth portal, you will also be able to view your health information using other applications (apps) compatible with our system.

## 2023-10-07 NOTE — ED ADULT TRIAGE NOTE - CHIEF COMPLAINT QUOTE
Presents with bilateral lower extremity swelling x 2 weeks. Pt endorses drinking alcohol several times a week. last drink was yesterday and states drank vodka. Denies any daily medications. Denies any difficulty breathing. Presents with bilateral lower extremity swelling x 2 weeks. Pt endorses drinking alcohol several times a week. last drink was yesterday and states drank vodka. Denies any daily medications. Denies any difficulty breathing. AOB noted.

## 2023-10-07 NOTE — ED ADULT NURSE NOTE - CHIEF COMPLAINT QUOTE
Presents with bilateral lower extremity swelling x 2 weeks. Pt endorses drinking alcohol several times a week. last drink was yesterday and states drank vodka. Denies any daily medications. Denies any difficulty breathing. AOB noted.

## 2023-10-07 NOTE — ED PROVIDER NOTE - NS ED MD DISPO DISCHARGE CCDA
no dysuria, no frequency, and no hematuria.
Patient/Caregiver provided printed discharge information.

## 2023-10-08 VITALS
SYSTOLIC BLOOD PRESSURE: 123 MMHG | RESPIRATION RATE: 18 BRPM | DIASTOLIC BLOOD PRESSURE: 68 MMHG | HEART RATE: 97 BPM | TEMPERATURE: 99 F | OXYGEN SATURATION: 97 %

## 2023-10-08 LAB
ALBUMIN SERPL ELPH-MCNC: 4.4 G/DL — SIGNIFICANT CHANGE UP (ref 3.3–5)
ALP SERPL-CCNC: 92 U/L — SIGNIFICANT CHANGE UP (ref 40–120)
ALT FLD-CCNC: 12 U/L — SIGNIFICANT CHANGE UP (ref 4–41)
ANION GAP SERPL CALC-SCNC: 16 MMOL/L — HIGH (ref 7–14)
ANION GAP SERPL CALC-SCNC: 19 MMOL/L — HIGH (ref 7–14)
ANISOCYTOSIS BLD QL: SIGNIFICANT CHANGE UP
APTT BLD: 34.2 SEC — SIGNIFICANT CHANGE UP (ref 24.5–35.6)
AST SERPL-CCNC: 21 U/L — SIGNIFICANT CHANGE UP (ref 4–40)
BASOPHILS # BLD AUTO: 0.05 K/UL — SIGNIFICANT CHANGE UP (ref 0–0.2)
BASOPHILS NFR BLD AUTO: 0.5 % — SIGNIFICANT CHANGE UP (ref 0–2)
BILIRUB SERPL-MCNC: 0.3 MG/DL — SIGNIFICANT CHANGE UP (ref 0.2–1.2)
BUN SERPL-MCNC: 6 MG/DL — LOW (ref 7–23)
BUN SERPL-MCNC: 7 MG/DL — SIGNIFICANT CHANGE UP (ref 7–23)
CALCIUM SERPL-MCNC: 8.2 MG/DL — LOW (ref 8.4–10.5)
CALCIUM SERPL-MCNC: 8.6 MG/DL — SIGNIFICANT CHANGE UP (ref 8.4–10.5)
CHLORIDE SERPL-SCNC: 107 MMOL/L — SIGNIFICANT CHANGE UP (ref 98–107)
CHLORIDE SERPL-SCNC: 108 MMOL/L — HIGH (ref 98–107)
CO2 SERPL-SCNC: 25 MMOL/L — SIGNIFICANT CHANGE UP (ref 22–31)
CO2 SERPL-SCNC: 25 MMOL/L — SIGNIFICANT CHANGE UP (ref 22–31)
CREAT SERPL-MCNC: 0.84 MG/DL — SIGNIFICANT CHANGE UP (ref 0.5–1.3)
CREAT SERPL-MCNC: 0.86 MG/DL — SIGNIFICANT CHANGE UP (ref 0.5–1.3)
EGFR: 114 ML/MIN/1.73M2 — SIGNIFICANT CHANGE UP
EGFR: 115 ML/MIN/1.73M2 — SIGNIFICANT CHANGE UP
EOSINOPHIL # BLD AUTO: 0.15 K/UL — SIGNIFICANT CHANGE UP (ref 0–0.5)
EOSINOPHIL NFR BLD AUTO: 1.6 % — SIGNIFICANT CHANGE UP (ref 0–6)
GIANT PLATELETS BLD QL SMEAR: PRESENT — SIGNIFICANT CHANGE UP
GLUCOSE SERPL-MCNC: 101 MG/DL — HIGH (ref 70–99)
GLUCOSE SERPL-MCNC: 152 MG/DL — HIGH (ref 70–99)
HCT VFR BLD CALC: 43.2 % — SIGNIFICANT CHANGE UP (ref 39–50)
HGB BLD-MCNC: 13.5 G/DL — SIGNIFICANT CHANGE UP (ref 13–17)
IANC: 5.5 K/UL — SIGNIFICANT CHANGE UP (ref 1.8–7.4)
IMM GRANULOCYTES NFR BLD AUTO: 0.4 % — SIGNIFICANT CHANGE UP (ref 0–0.9)
INR BLD: 1.01 RATIO — SIGNIFICANT CHANGE UP (ref 0.85–1.18)
LIDOCAIN IGE QN: 29 U/L — SIGNIFICANT CHANGE UP (ref 7–60)
LYMPHOCYTES # BLD AUTO: 2.77 K/UL — SIGNIFICANT CHANGE UP (ref 1–3.3)
LYMPHOCYTES # BLD AUTO: 30.4 % — SIGNIFICANT CHANGE UP (ref 13–44)
MANUAL SMEAR VERIFICATION: SIGNIFICANT CHANGE UP
MCHC RBC-ENTMCNC: 25.5 PG — LOW (ref 27–34)
MCHC RBC-ENTMCNC: 31.3 GM/DL — LOW (ref 32–36)
MCV RBC AUTO: 81.7 FL — SIGNIFICANT CHANGE UP (ref 80–100)
MONOCYTES # BLD AUTO: 0.82 K/UL — SIGNIFICANT CHANGE UP (ref 0–0.9)
MONOCYTES NFR BLD AUTO: 6.2 % — SIGNIFICANT CHANGE UP (ref 2–14)
NEUTROPHILS # BLD AUTO: 5.5 K/UL — SIGNIFICANT CHANGE UP (ref 1.8–7.4)
NEUTROPHILS NFR BLD AUTO: 56.2 % — SIGNIFICANT CHANGE UP (ref 43–77)
NRBC # BLD: 0 /100 WBCS — SIGNIFICANT CHANGE UP (ref 0–0)
NRBC # FLD: 0 K/UL — SIGNIFICANT CHANGE UP (ref 0–0)
NT-PROBNP SERPL-SCNC: <36 PG/ML — SIGNIFICANT CHANGE UP
PLAT MORPH BLD: NORMAL — SIGNIFICANT CHANGE UP
PLATELET # BLD AUTO: 225 K/UL — SIGNIFICANT CHANGE UP (ref 150–400)
PLATELET COUNT - ESTIMATE: NORMAL — SIGNIFICANT CHANGE UP
POTASSIUM SERPL-MCNC: 3.7 MMOL/L — SIGNIFICANT CHANGE UP (ref 3.5–5.3)
POTASSIUM SERPL-MCNC: 4 MMOL/L — SIGNIFICANT CHANGE UP (ref 3.5–5.3)
POTASSIUM SERPL-SCNC: 3.7 MMOL/L — SIGNIFICANT CHANGE UP (ref 3.5–5.3)
POTASSIUM SERPL-SCNC: 4 MMOL/L — SIGNIFICANT CHANGE UP (ref 3.5–5.3)
PROT SERPL-MCNC: 6.8 G/DL — SIGNIFICANT CHANGE UP (ref 6–8.3)
PROTHROM AB SERPL-ACNC: 11.4 SEC — SIGNIFICANT CHANGE UP (ref 9.5–13)
RBC # BLD: 5.29 M/UL — SIGNIFICANT CHANGE UP (ref 4.2–5.8)
RBC # FLD: 20.3 % — HIGH (ref 10.3–14.5)
RBC BLD AUTO: ABNORMAL
SMUDGE CELLS # BLD: PRESENT — SIGNIFICANT CHANGE UP
SODIUM SERPL-SCNC: 148 MMOL/L — HIGH (ref 135–145)
SODIUM SERPL-SCNC: 152 MMOL/L — HIGH (ref 135–145)
TROPONIN T, HIGH SENSITIVITY RESULT: 12 NG/L — SIGNIFICANT CHANGE UP
TROPONIN T, HIGH SENSITIVITY RESULT: 14 NG/L — SIGNIFICANT CHANGE UP
VARIANT LYMPHS # BLD: 2.7 % — SIGNIFICANT CHANGE UP (ref 0–6)
WBC # BLD: 9.33 K/UL — SIGNIFICANT CHANGE UP (ref 3.8–10.5)
WBC # FLD AUTO: 9.33 K/UL — SIGNIFICANT CHANGE UP (ref 3.8–10.5)

## 2023-10-08 PROCEDURE — 71045 X-RAY EXAM CHEST 1 VIEW: CPT | Mod: 26

## 2023-10-08 RX ORDER — SODIUM CHLORIDE 9 MG/ML
1000 INJECTION INTRAMUSCULAR; INTRAVENOUS; SUBCUTANEOUS ONCE
Refills: 0 | Status: COMPLETED | OUTPATIENT
Start: 2023-10-08 | End: 2023-10-08

## 2023-10-08 RX ADMIN — Medication 50 MILLIGRAM(S): at 00:52

## 2023-10-08 RX ADMIN — SODIUM CHLORIDE 1000 MILLILITER(S): 9 INJECTION INTRAMUSCULAR; INTRAVENOUS; SUBCUTANEOUS at 01:07

## 2024-01-15 ENCOUNTER — INPATIENT (INPATIENT)
Facility: HOSPITAL | Age: 39
LOS: 3 days | Discharge: HOME | End: 2024-01-19
Attending: STUDENT IN AN ORGANIZED HEALTH CARE EDUCATION/TRAINING PROGRAM | Admitting: STUDENT IN AN ORGANIZED HEALTH CARE EDUCATION/TRAINING PROGRAM
Payer: MEDICAID

## 2024-01-15 VITALS
TEMPERATURE: 98 F | DIASTOLIC BLOOD PRESSURE: 123 MMHG | SYSTOLIC BLOOD PRESSURE: 200 MMHG | OXYGEN SATURATION: 98 % | HEART RATE: 102 BPM | RESPIRATION RATE: 16 BRPM

## 2024-01-15 LAB
ALBUMIN SERPL ELPH-MCNC: 4.3 G/DL — SIGNIFICANT CHANGE UP (ref 3.3–5)
ALP SERPL-CCNC: 66 U/L — SIGNIFICANT CHANGE UP (ref 40–120)
ALT FLD-CCNC: 11 U/L — SIGNIFICANT CHANGE UP (ref 4–41)
ANION GAP SERPL CALC-SCNC: 14 MMOL/L — SIGNIFICANT CHANGE UP (ref 7–14)
APTT BLD: 33.6 SEC — SIGNIFICANT CHANGE UP (ref 24.5–35.6)
AST SERPL-CCNC: 20 U/L — SIGNIFICANT CHANGE UP (ref 4–40)
BASOPHILS # BLD AUTO: 0.05 K/UL — SIGNIFICANT CHANGE UP (ref 0–0.2)
BASOPHILS NFR BLD AUTO: 0.5 % — SIGNIFICANT CHANGE UP (ref 0–2)
BILIRUB SERPL-MCNC: 1.4 MG/DL — HIGH (ref 0.2–1.2)
BUN SERPL-MCNC: 10 MG/DL — SIGNIFICANT CHANGE UP (ref 7–23)
CALCIUM SERPL-MCNC: 9 MG/DL — SIGNIFICANT CHANGE UP (ref 8.4–10.5)
CHLORIDE SERPL-SCNC: 97 MMOL/L — LOW (ref 98–107)
CO2 SERPL-SCNC: 29 MMOL/L — SIGNIFICANT CHANGE UP (ref 22–31)
CREAT SERPL-MCNC: 0.93 MG/DL — SIGNIFICANT CHANGE UP (ref 0.5–1.3)
EGFR: 108 ML/MIN/1.73M2 — SIGNIFICANT CHANGE UP
EOSINOPHIL # BLD AUTO: 0.07 K/UL — SIGNIFICANT CHANGE UP (ref 0–0.5)
EOSINOPHIL NFR BLD AUTO: 0.7 % — SIGNIFICANT CHANGE UP (ref 0–6)
GLUCOSE SERPL-MCNC: 77 MG/DL — SIGNIFICANT CHANGE UP (ref 70–99)
HCT VFR BLD CALC: 45.2 % — SIGNIFICANT CHANGE UP (ref 39–50)
HGB BLD-MCNC: 14.8 G/DL — SIGNIFICANT CHANGE UP (ref 13–17)
IANC: 6.85 K/UL — SIGNIFICANT CHANGE UP (ref 1.8–7.4)
IMM GRANULOCYTES NFR BLD AUTO: 0.4 % — SIGNIFICANT CHANGE UP (ref 0–0.9)
INR BLD: 1.11 RATIO — SIGNIFICANT CHANGE UP (ref 0.85–1.18)
LYMPHOCYTES # BLD AUTO: 2.12 K/UL — SIGNIFICANT CHANGE UP (ref 1–3.3)
LYMPHOCYTES # BLD AUTO: 21.5 % — SIGNIFICANT CHANGE UP (ref 13–44)
MAGNESIUM SERPL-MCNC: 1.8 MG/DL — SIGNIFICANT CHANGE UP (ref 1.6–2.6)
MCHC RBC-ENTMCNC: 25.5 PG — LOW (ref 27–34)
MCHC RBC-ENTMCNC: 32.7 GM/DL — SIGNIFICANT CHANGE UP (ref 32–36)
MCV RBC AUTO: 77.9 FL — LOW (ref 80–100)
MONOCYTES # BLD AUTO: 0.75 K/UL — SIGNIFICANT CHANGE UP (ref 0–0.9)
MONOCYTES NFR BLD AUTO: 7.6 % — SIGNIFICANT CHANGE UP (ref 2–14)
NEUTROPHILS # BLD AUTO: 6.85 K/UL — SIGNIFICANT CHANGE UP (ref 1.8–7.4)
NEUTROPHILS NFR BLD AUTO: 69.3 % — SIGNIFICANT CHANGE UP (ref 43–77)
NRBC # BLD: 0 /100 WBCS — SIGNIFICANT CHANGE UP (ref 0–0)
NRBC # FLD: 0 K/UL — SIGNIFICANT CHANGE UP (ref 0–0)
PHOSPHATE SERPL-MCNC: 2.7 MG/DL — SIGNIFICANT CHANGE UP (ref 2.5–4.5)
PLATELET # BLD AUTO: 184 K/UL — SIGNIFICANT CHANGE UP (ref 150–400)
POTASSIUM SERPL-MCNC: 3.6 MMOL/L — SIGNIFICANT CHANGE UP (ref 3.5–5.3)
POTASSIUM SERPL-SCNC: 3.6 MMOL/L — SIGNIFICANT CHANGE UP (ref 3.5–5.3)
PROT SERPL-MCNC: 7.7 G/DL — SIGNIFICANT CHANGE UP (ref 6–8.3)
PROTHROM AB SERPL-ACNC: 12.5 SEC — SIGNIFICANT CHANGE UP (ref 9.5–13)
RBC # BLD: 5.8 M/UL — SIGNIFICANT CHANGE UP (ref 4.2–5.8)
RBC # FLD: 20 % — HIGH (ref 10.3–14.5)
SODIUM SERPL-SCNC: 140 MMOL/L — SIGNIFICANT CHANGE UP (ref 135–145)
WBC # BLD: 9.88 K/UL — SIGNIFICANT CHANGE UP (ref 3.8–10.5)
WBC # FLD AUTO: 9.88 K/UL — SIGNIFICANT CHANGE UP (ref 3.8–10.5)

## 2024-01-15 PROCEDURE — 71045 X-RAY EXAM CHEST 1 VIEW: CPT | Mod: 26

## 2024-01-15 PROCEDURE — 99285 EMERGENCY DEPT VISIT HI MDM: CPT

## 2024-01-15 RX ORDER — SODIUM CHLORIDE 9 MG/ML
1000 INJECTION INTRAMUSCULAR; INTRAVENOUS; SUBCUTANEOUS ONCE
Refills: 0 | Status: COMPLETED | OUTPATIENT
Start: 2024-01-15 | End: 2024-01-15

## 2024-01-15 RX ORDER — FOLIC ACID 0.8 MG
1 TABLET ORAL DAILY
Refills: 0 | Status: DISCONTINUED | OUTPATIENT
Start: 2024-01-15 | End: 2024-01-16

## 2024-01-15 RX ORDER — THIAMINE MONONITRATE (VIT B1) 100 MG
100 TABLET ORAL ONCE
Refills: 0 | Status: COMPLETED | OUTPATIENT
Start: 2024-01-15 | End: 2024-01-15

## 2024-01-15 RX ORDER — ONDANSETRON 8 MG/1
4 TABLET, FILM COATED ORAL ONCE
Refills: 0 | Status: COMPLETED | OUTPATIENT
Start: 2024-01-15 | End: 2024-01-15

## 2024-01-15 RX ADMIN — Medication 1 TABLET(S): at 22:02

## 2024-01-15 RX ADMIN — ONDANSETRON 4 MILLIGRAM(S): 8 TABLET, FILM COATED ORAL at 22:01

## 2024-01-15 RX ADMIN — Medication 100 MILLIGRAM(S): at 22:01

## 2024-01-15 RX ADMIN — SODIUM CHLORIDE 1000 MILLILITER(S): 9 INJECTION INTRAMUSCULAR; INTRAVENOUS; SUBCUTANEOUS at 22:04

## 2024-01-15 RX ADMIN — Medication 1 MILLIGRAM(S): at 22:01

## 2024-01-15 NOTE — ED PROVIDER NOTE - ATTENDING CONTRIBUTION TO CARE
Peter Young DO:  patient seen and evaluated with the resident.  I was present for key portions of the History & Physical, and I agree with the Impression & Plan. 37 yo m pmh  alcohol use disorder, hypertension, pw Hemoptysis.  Patient reports today had 2 episodes.  Reports had sudden onset N/V, noticed mild blood in it.  Unable to quantify further.  Also noticed when he sneezed he had epistaxis.  Both have resolved.  Denies recent travel, recent intense bouts of emesis.  Denies abdominal pain.  Denies blood in stool.  Denies hematuria.  Patient reports chronic history of alcohol abuse, reports he goes on benders.  Has attempted to obtain help in past, unsuccessful.  Also has history of elevated elevated blood pressure.  Patient reports interest in detox.  Denies F/C.  Patient arrives HDS, appears to be in mild withdrawal.  Will check CIWA, treat symptoms, reassess.  Do not suspect PE.  Wells criteria low.  Do not suspect of esophageal varices at this time.  Labs, imaging, reassess

## 2024-01-15 NOTE — ED ADULT TRIAGE NOTE - CHIEF COMPLAINT QUOTE
Pt. c/o vomiting blood x 2 episodes today. States he also had a nosebleed. Pt. noted to be hypertensive but not on any medications. Denies chest pain, abdominal pain, diarrhea or fevers.

## 2024-01-15 NOTE — ED ADULT NURSE NOTE - OBJECTIVE STATEMENT
How Severe Is Your Cyst?: moderate
Is This A New Presentation, Or A Follow-Up?: Cyst
pt brought to rm 3, A&Ox3, skin w/d/i, amb, brought from urgi-care by EMS for "flu like symptoms" associated w/ CP, and HTN, denies past medical history, symptoms x past few days, cough noted on presentation, as per pt received advil and 1 SL NTG prior to ED arrival, states pain partially resolved, no resp distress on presentation, able to complete sentences, EKG complete, placed on CM for monitoring, #20g L hand by EMS w/ 1L NS infusing, #18g R AC placed by RN, labs sent, MD Servin @ bedside for eval, meds/NS as per orders, awaiting results and further orders, will continue to monitor.  (break)

## 2024-01-15 NOTE — ED PROVIDER NOTE - OBJECTIVE STATEMENT
38-year-old male with history of alcohol use disorder, hypertension, but has never taken medications, no history of seizures, ICU stay or hospital admission for alcohol withdrawal, presenting with 24 hours of nausea 1 episode of vomiting with uncertain amount of blood, followed by 2 nosebleeds that resolved quickly and are currently resolved.  Denies any recent fevers or chills, any diarrhea, any urinary symptoms, any fevers, any chest pain, does have a mild headache but no blurry vision, no neck pain.  Denies any lower extremity swelling.  Has been drinking 2 to 4 pints over the past week for the past several days but stopped drinking 2 days ago, and has not been experiencing symptoms other than the ones that he developed earlier today until now.

## 2024-01-15 NOTE — ED PROVIDER NOTE - CLINICAL SUMMARY MEDICAL DECISION MAKING FREE TEXT BOX
38-year-old male with history of multiple episodes of binge drinking, hypertension, presenting with 1 episode of emesis followed by nosebleed with possible blood in emesis now with symptoms mostly resolving.  Noted to have tongue fasciculations with last drink being 48 hours ago.  Differential includes but is not limited to alcohol withdrawal versus electrolyte abnormalities, dehydration, consider Alexandra-Tirado tear, though less likely given blood noticed with first episode of emesis and had concurrent nosebleed at same time.  Less likely Boerhaave syndrome but will obtain screening chest x-ray.  Plan for basic labs, electrolytes, antinausea medication and IV fluids, will place on CIWA give Ativan and reassess.

## 2024-01-15 NOTE — ED PROVIDER NOTE - PHYSICAL EXAMINATION
Constitutional: Well nourished, well developed, appears stated age.   Eyes: PERRL, EOMI. No scleral icterus  HENT: Moist mucous membranes. some tongue fasciculations  Neck: Supple. No goiter. No C-spine TTP. No meningismus  CV: RRR, no m/r/g. Well perfused extremities.   RESP: Lungs CTAB, normal respiratory effort  GI: Soft, non-tender, no masses appreciated  MSK: Moving all four extremities. No obvious deformity  Skin: Warm, dry, no rashes  Neuro: Alert and oriented. CNII-XII grossly intact. Normal strength and sensation of UEs and LEs  Psych: Appropriate mood and affect

## 2024-01-16 DIAGNOSIS — F10.239 ALCOHOL DEPENDENCE WITH WITHDRAWAL, UNSPECIFIED: ICD-10-CM

## 2024-01-16 DIAGNOSIS — Z29.9 ENCOUNTER FOR PROPHYLACTIC MEASURES, UNSPECIFIED: ICD-10-CM

## 2024-01-16 DIAGNOSIS — I10 ESSENTIAL (PRIMARY) HYPERTENSION: ICD-10-CM

## 2024-01-16 DIAGNOSIS — E80.6 OTHER DISORDERS OF BILIRUBIN METABOLISM: ICD-10-CM

## 2024-01-16 DIAGNOSIS — K92.0 HEMATEMESIS: ICD-10-CM

## 2024-01-16 DIAGNOSIS — F17.200 NICOTINE DEPENDENCE, UNSPECIFIED, UNCOMPLICATED: ICD-10-CM

## 2024-01-16 LAB
ALBUMIN SERPL ELPH-MCNC: 3.9 G/DL — SIGNIFICANT CHANGE UP (ref 3.3–5)
ALP SERPL-CCNC: 57 U/L — SIGNIFICANT CHANGE UP (ref 40–120)
ALT FLD-CCNC: 11 U/L — SIGNIFICANT CHANGE UP (ref 4–41)
ANION GAP SERPL CALC-SCNC: 13 MMOL/L — SIGNIFICANT CHANGE UP (ref 7–14)
APPEARANCE UR: CLEAR — SIGNIFICANT CHANGE UP
AST SERPL-CCNC: 20 U/L — SIGNIFICANT CHANGE UP (ref 4–40)
BASOPHILS # BLD AUTO: 0.03 K/UL — SIGNIFICANT CHANGE UP (ref 0–0.2)
BASOPHILS NFR BLD AUTO: 0.4 % — SIGNIFICANT CHANGE UP (ref 0–2)
BILIRUB SERPL-MCNC: 1.5 MG/DL — HIGH (ref 0.2–1.2)
BILIRUB UR-MCNC: NEGATIVE — SIGNIFICANT CHANGE UP
BLD GP AB SCN SERPL QL: NEGATIVE — SIGNIFICANT CHANGE UP
BUN SERPL-MCNC: 9 MG/DL — SIGNIFICANT CHANGE UP (ref 7–23)
CALCIUM SERPL-MCNC: 8.2 MG/DL — LOW (ref 8.4–10.5)
CHLORIDE SERPL-SCNC: 99 MMOL/L — SIGNIFICANT CHANGE UP (ref 98–107)
CO2 SERPL-SCNC: 25 MMOL/L — SIGNIFICANT CHANGE UP (ref 22–31)
COLOR SPEC: YELLOW — SIGNIFICANT CHANGE UP
CREAT SERPL-MCNC: 0.91 MG/DL — SIGNIFICANT CHANGE UP (ref 0.5–1.3)
DIFF PNL FLD: NEGATIVE — SIGNIFICANT CHANGE UP
EGFR: 111 ML/MIN/1.73M2 — SIGNIFICANT CHANGE UP
EOSINOPHIL # BLD AUTO: 0.07 K/UL — SIGNIFICANT CHANGE UP (ref 0–0.5)
EOSINOPHIL NFR BLD AUTO: 0.9 % — SIGNIFICANT CHANGE UP (ref 0–6)
GLUCOSE SERPL-MCNC: 93 MG/DL — SIGNIFICANT CHANGE UP (ref 70–99)
GLUCOSE UR QL: NEGATIVE MG/DL — SIGNIFICANT CHANGE UP
HCT VFR BLD CALC: 43.2 % — SIGNIFICANT CHANGE UP (ref 39–50)
HGB BLD-MCNC: 14 G/DL — SIGNIFICANT CHANGE UP (ref 13–17)
IANC: 5.79 K/UL — SIGNIFICANT CHANGE UP (ref 1.8–7.4)
IMM GRANULOCYTES NFR BLD AUTO: 0.6 % — SIGNIFICANT CHANGE UP (ref 0–0.9)
KETONES UR-MCNC: 40 MG/DL
LEUKOCYTE ESTERASE UR-ACNC: NEGATIVE — SIGNIFICANT CHANGE UP
LIDOCAIN IGE QN: 20 U/L — SIGNIFICANT CHANGE UP (ref 7–60)
LYMPHOCYTES # BLD AUTO: 1.37 K/UL — SIGNIFICANT CHANGE UP (ref 1–3.3)
LYMPHOCYTES # BLD AUTO: 17.1 % — SIGNIFICANT CHANGE UP (ref 13–44)
MAGNESIUM SERPL-MCNC: 1.9 MG/DL — SIGNIFICANT CHANGE UP (ref 1.6–2.6)
MCHC RBC-ENTMCNC: 25.3 PG — LOW (ref 27–34)
MCHC RBC-ENTMCNC: 32.4 GM/DL — SIGNIFICANT CHANGE UP (ref 32–36)
MCV RBC AUTO: 78.1 FL — LOW (ref 80–100)
MONOCYTES # BLD AUTO: 0.69 K/UL — SIGNIFICANT CHANGE UP (ref 0–0.9)
MONOCYTES NFR BLD AUTO: 8.6 % — SIGNIFICANT CHANGE UP (ref 2–14)
NEUTROPHILS # BLD AUTO: 5.79 K/UL — SIGNIFICANT CHANGE UP (ref 1.8–7.4)
NEUTROPHILS NFR BLD AUTO: 72.4 % — SIGNIFICANT CHANGE UP (ref 43–77)
NITRITE UR-MCNC: NEGATIVE — SIGNIFICANT CHANGE UP
NRBC # BLD: 0 /100 WBCS — SIGNIFICANT CHANGE UP (ref 0–0)
NRBC # FLD: 0 K/UL — SIGNIFICANT CHANGE UP (ref 0–0)
PH UR: 6.5 — SIGNIFICANT CHANGE UP (ref 5–8)
PHOSPHATE SERPL-MCNC: 2.6 MG/DL — SIGNIFICANT CHANGE UP (ref 2.5–4.5)
PLATELET # BLD AUTO: 164 K/UL — SIGNIFICANT CHANGE UP (ref 150–400)
POTASSIUM SERPL-MCNC: 3.6 MMOL/L — SIGNIFICANT CHANGE UP (ref 3.5–5.3)
POTASSIUM SERPL-SCNC: 3.6 MMOL/L — SIGNIFICANT CHANGE UP (ref 3.5–5.3)
PROT SERPL-MCNC: 7.1 G/DL — SIGNIFICANT CHANGE UP (ref 6–8.3)
PROT UR-MCNC: NEGATIVE MG/DL — SIGNIFICANT CHANGE UP
RBC # BLD: 5.53 M/UL — SIGNIFICANT CHANGE UP (ref 4.2–5.8)
RBC # FLD: 19.5 % — HIGH (ref 10.3–14.5)
RH IG SCN BLD-IMP: POSITIVE — SIGNIFICANT CHANGE UP
SODIUM SERPL-SCNC: 137 MMOL/L — SIGNIFICANT CHANGE UP (ref 135–145)
SP GR SPEC: 1.02 — SIGNIFICANT CHANGE UP (ref 1–1.03)
TSH SERPL-MCNC: 2.56 UIU/ML — SIGNIFICANT CHANGE UP (ref 0.27–4.2)
UROBILINOGEN FLD QL: 1 MG/DL — SIGNIFICANT CHANGE UP (ref 0.2–1)
WBC # BLD: 8 K/UL — SIGNIFICANT CHANGE UP (ref 3.8–10.5)
WBC # FLD AUTO: 8 K/UL — SIGNIFICANT CHANGE UP (ref 3.8–10.5)

## 2024-01-16 PROCEDURE — 99223 1ST HOSP IP/OBS HIGH 75: CPT

## 2024-01-16 PROCEDURE — 99223 1ST HOSP IP/OBS HIGH 75: CPT | Mod: GC

## 2024-01-16 RX ORDER — SODIUM CHLORIDE 9 MG/ML
1000 INJECTION INTRAMUSCULAR; INTRAVENOUS; SUBCUTANEOUS ONCE
Refills: 0 | Status: COMPLETED | OUTPATIENT
Start: 2024-01-16 | End: 2024-01-16

## 2024-01-16 RX ORDER — PANTOPRAZOLE SODIUM 20 MG/1
40 TABLET, DELAYED RELEASE ORAL EVERY 12 HOURS
Refills: 0 | Status: DISCONTINUED | OUTPATIENT
Start: 2024-01-16 | End: 2024-01-19

## 2024-01-16 RX ORDER — ACETAMINOPHEN 500 MG
650 TABLET ORAL EVERY 6 HOURS
Refills: 0 | Status: DISCONTINUED | OUTPATIENT
Start: 2024-01-16 | End: 2024-01-19

## 2024-01-16 RX ORDER — ONDANSETRON 8 MG/1
4 TABLET, FILM COATED ORAL EVERY 8 HOURS
Refills: 0 | Status: DISCONTINUED | OUTPATIENT
Start: 2024-01-16 | End: 2024-01-19

## 2024-01-16 RX ORDER — LABETALOL HCL 100 MG
100 TABLET ORAL EVERY 8 HOURS
Refills: 0 | Status: DISCONTINUED | OUTPATIENT
Start: 2024-01-16 | End: 2024-01-17

## 2024-01-16 RX ORDER — LANOLIN ALCOHOL/MO/W.PET/CERES
3 CREAM (GRAM) TOPICAL AT BEDTIME
Refills: 0 | Status: DISCONTINUED | OUTPATIENT
Start: 2024-01-16 | End: 2024-01-19

## 2024-01-16 RX ORDER — THIAMINE MONONITRATE (VIT B1) 100 MG
500 TABLET ORAL DAILY
Refills: 0 | Status: DISCONTINUED | OUTPATIENT
Start: 2024-01-16 | End: 2024-01-19

## 2024-01-16 RX ORDER — PANTOPRAZOLE SODIUM 20 MG/1
80 TABLET, DELAYED RELEASE ORAL ONCE
Refills: 0 | Status: COMPLETED | OUTPATIENT
Start: 2024-01-16 | End: 2024-01-16

## 2024-01-16 RX ORDER — NICOTINE POLACRILEX 2 MG
1 GUM BUCCAL DAILY
Refills: 0 | Status: DISCONTINUED | OUTPATIENT
Start: 2024-01-16 | End: 2024-01-17

## 2024-01-16 RX ADMIN — Medication 2 MILLIGRAM(S): at 00:30

## 2024-01-16 RX ADMIN — PANTOPRAZOLE SODIUM 40 MILLIGRAM(S): 20 TABLET, DELAYED RELEASE ORAL at 17:10

## 2024-01-16 RX ADMIN — Medication 1.5 MILLIGRAM(S): at 11:14

## 2024-01-16 RX ADMIN — Medication 1.5 MILLIGRAM(S): at 17:11

## 2024-01-16 RX ADMIN — Medication 1.5 MILLIGRAM(S): at 04:16

## 2024-01-16 RX ADMIN — Medication 100 MILLIGRAM(S): at 13:32

## 2024-01-16 RX ADMIN — SODIUM CHLORIDE 1000 MILLILITER(S): 9 INJECTION INTRAMUSCULAR; INTRAVENOUS; SUBCUTANEOUS at 02:58

## 2024-01-16 RX ADMIN — Medication 100 MILLIGRAM(S): at 06:42

## 2024-01-16 RX ADMIN — PANTOPRAZOLE SODIUM 80 MILLIGRAM(S): 20 TABLET, DELAYED RELEASE ORAL at 04:18

## 2024-01-16 RX ADMIN — Medication 100 MILLIGRAM(S): at 23:29

## 2024-01-16 RX ADMIN — Medication 1 PATCH: at 12:18

## 2024-01-16 RX ADMIN — Medication 1.5 MILLIGRAM(S): at 21:44

## 2024-01-16 RX ADMIN — Medication 1.5 MILLIGRAM(S): at 13:33

## 2024-01-16 NOTE — ED ADULT NURSE REASSESSMENT NOTE - NS ED NURSE REASSESS COMMENT FT1
PT is resting in stretcher, easily arousable to verbal stimuli. no apparent distress noted at this time. hand off will be given to primary nurse when return to area.
REPORT RECEIVED from overnight RN. A7Ox4. ambulatory. NAD. pt denies SOB, chest pain, dizziness, weakness, urinary symptoms, HA, n/v/d, fevers, chills, pain. respirations are even and un labored. skin intact. pt states he binge drinks. last time he drank was on Saturday and had a pint of vodka. PT states he had 2 episodes of bright red vomit yesterday. call bell at bedside. urinal provided.

## 2024-01-16 NOTE — H&P ADULT - ASSESSMENT
38 year-old male, smoker, with history of alcohol use disorder, HTN (never taken medications) a/w alcohol dependence with withdrawal and hematemesis.  38 year-old male, smoker, with history of alcohol use disorder, HTN (never taken medications) a/w hematemesis and alcohol dependence with withdrawal requiring high risk CIWA with Ativan IVP taper

## 2024-01-16 NOTE — H&P ADULT - NSHPPHYSICALEXAM_GEN_ALL_CORE
Vital Signs Last 24 Hrs  T(C): 37.4 (15 Bertin 2024 18:50), Max: 37.4 (15 Bertin 2024 18:50)  T(F): 99.3 (15 Bertin 2024 18:50), Max: 99.3 (15 Bertin 2024 18:50)  HR: 91 (15 Bertin 2024 18:50) (91 - 102)  BP: 188/111 (15 Bertin 2024 18:50) (188/111 - 200/123)  BP(mean): --  RR: 18 (15 Bertin 2024 18:50) (16 - 18)  SpO2: 98% (15 Bertin 2024 18:50) (98% - 98%)    Parameters below as of 15 Bertin 2024 18:50  Patient On (Oxygen Delivery Method): room air

## 2024-01-16 NOTE — H&P ADULT - PROBLEM SELECTOR PLAN 1
Acute, 2 episodes of bright blood emesis, volume estimated by the pt = 1/2 rinku x 2  Hgb wnl  Hemorrhagic alcoholic gastritis vs PUD vs Alexandra-Tirado tear  CXR: clear lungs    -NPO  -Hold all non-essential oral agents   -Protonix IV BID  -Monitor Hgb q12-24h  -Type&Screen ordered Acute, 2 episodes of bright blood emesis, volume estimated by the pt = 1/2 rinku x 2  Hgb wnl  Hemorrhagic alcoholic gastritis vs PUD vs Alexandra-Tirado tear  CXR: clear lungs    -NPO  -Hold all non-essential oral agents   -Protonix IV BID  -Monitor Hgb q12-24h  -Type&Screen ordered  -Formal GI c/s requested

## 2024-01-16 NOTE — PROGRESS NOTE ADULT - ASSESSMENT
38M smoker, EtOH disorder, HTN (no medications) a/w hematemesis and alcohol dependence with withdrawal.

## 2024-01-16 NOTE — H&P ADULT - NEGATIVE GENERAL GENITOURINARY SYMPTOMS
Infusion Nursing Note:  Irina Hanks presents today for phlebotomy.    Patient seen by provider today: No   present during visit today: Not Applicable.    Note: Has had a cough for a couple weeks. No fever. May go to urgent care to make sure it's nothing but a virus.    Intravenous Access:  Phlebotomy site LAC, Needle type apheresis, Gauge 17.    Treatment Conditions:  Lab Results   Component Value Date    HGB 13.2 01/23/2020     Lab Results   Component Value Date    WBC 6.3 01/23/2020      Lab Results   Component Value Date    ANEU 2.0 09/17/2019     Lab Results   Component Value Date     01/23/2020      Lab Results   Component Value Date     06/12/2018                   Lab Results   Component Value Date    POTASSIUM 3.6 06/12/2018           No results found for: MAG         Lab Results   Component Value Date    CR 0.76 06/12/2018                   Lab Results   Component Value Date    LUCA 9.3 06/12/2018                Lab Results   Component Value Date    BILITOTAL 0.9 06/18/2019           Lab Results   Component Value Date    ALBUMIN 4.0 06/18/2019                    Lab Results   Component Value Date    ALT 23 01/23/2020           Lab Results   Component Value Date    AST 18 01/23/2020       Results reviewed, labs MET treatment parameters, ok to proceed with treatment.      Post Infusion Assessment:  Patient tolerated phlebotomy without incident.  Patient observed for 20 minutes post phlebotomy per protocol.  Site patent and intact, free from redness, edema or discomfort.  No evidence of extravasations.  Access discontinued per protocol.       Discharge Plan:   AVS to patient via MYCHART.  Patient will return as prev ray for next appointment.   Patient discharged in stable condition accompanied by: self.  Departure Mode: Ambulatory.    Rory Rivera RN                         no flank pain L/no flank pain R/no dysuria

## 2024-01-16 NOTE — H&P ADULT - PROBLEM SELECTOR PLAN 4
Uncontrolled, exacerbated by alcohol withdrawal   Not compliant with home Lisinopril and HCTZ  SBP in 200s mmHg initially   EKG: no acute changes     -Started Labetalol 100mg po q8h while withdrawing from alcohol   -Monitor closely BP  -VS q4h

## 2024-01-16 NOTE — H&P ADULT - NSHPLABSRESULTS_GEN_ALL_CORE
.    -Personally interpreted EKbpm, qtc 415, no acute Tw or ST changes, no PACs or PVCs .    -Personally interpreted EKG: NSR 91bpm, qtc 415, no acute Tw or ST changes, no PACs or PVCs      -Personally reviewed the following labs below:                          14.8   9.88  )-----------( 184      ( 15 Bertin 2024 21:57 )             45.2   01-15    140  |  97<L>  |  10  ----------------------------<  77  3.6   |  29  |  0.93    Ca    9.0      15 Bertin 2024 21:57  Phos  2.7     01-15  Mg     1.80     01-15    TPro  7.7  /  Alb  4.3  /  TBili  1.4<H>  /  DBili  x   /  AST  20  /  ALT  11  /  AlkPhos  66  01-15 .    -Personally interpreted EKG: NSR 91bpm, qtc 415, no acute Tw or ST changes, no PACs or PVCs    -Personally interpreted CXR: clear lungs, no pleural effusions, no pneumothorax      -Personally reviewed the following labs below:                          14.8   9.88  )-----------( 184      ( 15 Bertin 2024 21:57 )             45.2   01-15    140  |  97<L>  |  10  ----------------------------<  77  3.6   |  29  |  0.93    Ca    9.0      15 Bertin 2024 21:57  Phos  2.7     01-15  Mg     1.80     01-15    TPro  7.7  /  Alb  4.3  /  TBili  1.4<H>  /  DBili  x   /  AST  20  /  ALT  11  /  AlkPhos  66  01-15

## 2024-01-16 NOTE — ED ADULT NURSE NOTE - OBJECTIVE STATEMENT
pt is A&Ox3, ambulatory, able to make needs known and presents with C/O abdominal distention nausea and vomiting, breathing with ease, no signs of acute distress

## 2024-01-16 NOTE — H&P ADULT - PROBLEM SELECTOR PLAN 2
Heavy daily intake c/w tolerance and dependence;   High risk for DT    -CIWA with Ativan IVP PRN per protocol   -Ativan IV Taper   -Thiamine high dose IVPB x5 days   -Seizure, fall precautions  -f/u Lipase  -f/u TSH

## 2024-01-16 NOTE — ED ADULT NURSE NOTE - CHPI ED NUR SYMPTOMS NEG
no abdominal distension/no abdominal pain/no chills/no confusion/no disorientation/no fever/no nausea/no pain/no vomiting/no weakness no

## 2024-01-16 NOTE — CONSULT NOTE ADULT - ASSESSMENT
38M with hx of alcohol use disorder (2 pints daily, binge drinking), HTN (never taken medications) with hx of withdrawal presenting with epistaxis/hematemesis    Impression  #epistaxis; resolved  #hematemesis; suspect 2/2 epistaxis; other less likely DDx include MWT vs. PUD; less likely malignancy or esophagitis  - Hgb ranging 14-15; no further vomiting since yesterday evening  - no previous EGD  - no NSAID hx; no FHx of GI malignancy; no hx of liver disease    #EtOH withdrawal; on ativan taper    Recommendations  - trend H/H and monitor for recurrent hematemesis  - can consider EGD pending completion of ativan taper or can follow up as outpatient for EGD if no further hematemesis    Note and recommendations are incomplete until reviewed and attested by attending.    Rich Cline MD  GI/Hepatology Fellow, PGY4  Teams preferred (7AM to 5PM); after 5PM, call GI fellow on call    NEW CONSULTS:  Please email [email or web address] 38M with hx of alcohol use disorder (2 pints daily, binge drinking), HTN (never taken medications) with hx of withdrawal presenting with epistaxis/hematemesis    Impression  #epistaxis; resolved  #hematemesis; suspect 2/2 epistaxis; other less likely DDx include MWT vs. PUD; less likely malignancy or esophagitis  - Hgb ranging 14-15; no further vomiting since yesterday evening  - no previous EGD  - no NSAID hx; no FHx of GI malignancy; no hx of liver disease    #EtOH withdrawal; on ativan taper    Recommendations  - trend H/H and monitor for recurrent hematemesis  - can consider EGD pending completion of ativan taper or can follow up as outpatient for EGD if no further hematemesis    Note and recommendations are incomplete until reviewed and attested by attending.    Rich Cline MD  GI/Hepatology Fellow, PGY4  Teams preferred (7AM to 5PM); after 5PM, call GI fellow on call    NEW CONSULTS:  Please email [email or web address]

## 2024-01-16 NOTE — SBIRT NOTE ADULT - NSSBIRTALCPASSREFTXDET_GEN_A_CORE
Provided SBIRT services: Full screen positive. Referral to Treatment Performed. Screening results were reviewed with the patient and patient was provided information about healthy guidelines and potential negative consequences associated with level of risk. Motivation and readiness to reduce or stop use was discussed and goals and activities to make changes were suggested/offered. Referral for complete assessment and level of care determination at a certified treatment facility was completed by giving the patient information for treatment facilities that met their needs and encouraging them to call for an appointment. A call was not made to a facility because Patient not interested at this time  Patient currently has a treatment plan setup or currently in treatment  Referral already made by another staff member   Provided SBIRT services: Full Screen Positive. Brief Intervention Performed and Referral to Treatment Attempted.  Screening results were reviewed with the patient and patient was provided information about healthy guidelines and   potential negative consequences associated with level of risk. Motivation and readiness to reduce or stop use was   discussed and goals and activities to make changes were suggested/offered.   Options discussed for further evaluation and treatment, but referral to treatment was not completed because  • Patient requires medical care

## 2024-01-16 NOTE — H&P ADULT - PROBLEM SELECTOR PLAN 3
Likely due to vomiting/ retching  LFTs wnl, low suspicion for biliary obstructive process     -Trend

## 2024-01-16 NOTE — H&P ADULT - HISTORY OF PRESENT ILLNESS
38 year-old male with history of alcohol use disorder, hypertension, but has never taken medications, no history of seizures, ICU admission for alcohol withdrawal; Pt c/o 24 hours of nausea 1 episode of vomiting with uncertain amount of blood, followed by 2 nosebleeds that resolved. Last alcohol ingestion 48 hours ago. Reports no recent fevers or chills, any diarrhea, any urinary symptoms, any fevers, any chest pain.Has been drinking 2 to 4 pints daily over the past week for the past several days. Stopped using alcohol 2 days ago. 38 year-old male, smoker, with history of alcohol use disorder, HTN (never taken medications); Reports no history of withdrawal seizures and no prior ICU admissions for alcohol withdrawal; Pt c/o 24 hours of nausea and 2 episodes of vomiting bright blood - volume estimated to 1/2 glass of bloody vomitus. Also reports a nosebleed that resolved. Last alcohol ingestion 48 hours ago. Has been drinking 2 pints daily over the past several days. Stopped using alcohol 2 days ago, would like to go to rehab. In the recent past, when attempting to quit alcohol use, would develop tremors, severe sweating, anxiety and auditory hallucinations.

## 2024-01-16 NOTE — ED ADULT NURSE NOTE - NSFALLUNIVINTERV_ED_ALL_ED
Bed/Stretcher in lowest position, wheels locked, appropriate side rails in place/Call bell, personal items and telephone in reach/Instruct patient to call for assistance before getting out of bed/chair/stretcher/Non-slip footwear applied when patient is off stretcher/[place] to call system/Physically safe environment - no spills, clutter or unnecessary equipment/Purposeful proactive rounding/Room/bathroom lighting operational, light cord in reach Bed/Stretcher in lowest position, wheels locked, appropriate side rails in place/Call bell, personal items and telephone in reach/Instruct patient to call for assistance before getting out of bed/chair/stretcher/Non-slip footwear applied when patient is off stretcher/[place] to call system/Physically safe environment - no spills, clutter or unnecessary equipment/Purposeful proactive rounding/Room/bathroom lighting operational, light cord in reach

## 2024-01-16 NOTE — PROGRESS NOTE ADULT - SUBJECTIVE AND OBJECTIVE BOX
Patient is a 38y old  Male who presents with a chief complaint of Vomiting blood x 2 episodes     SUBJECTIVE / OVERNIGHT EVENTS:    No CP, SOB, f/c/n/v   no further vomiting or blood, no BM since here, last BM days ago normal color     MEDICATIONS  (STANDING):  labetalol 100 milliGRAM(s) Oral every 8 hours  LORazepam   Injectable 1.5 milliGRAM(s) IV Push every 4 hours  LORazepam   Injectable   IV Push   nicotine -   7 mG/24Hr(s) Patch 1 Patch Transdermal daily  pantoprazole  Injectable 40 milliGRAM(s) IV Push every 12 hours  thiamine IVPB 500 milliGRAM(s) IV Intermittent daily    MEDICATIONS  (PRN):  acetaminophen     Tablet .. 650 milliGRAM(s) Oral every 6 hours PRN Temp greater or equal to 38C (100.4F), Mild Pain (1 - 3)  LORazepam   Injectable 2 milliGRAM(s) IV Push every 2 hours PRN Symptom-triggered: each CIWA -Ar score 8 or GREATER  melatonin 3 milliGRAM(s) Oral at bedtime PRN Insomnia  ondansetron Injectable 4 milliGRAM(s) IV Push every 8 hours PRN Nausea and/or Vomiting    T(C): 37 (01-16-24 @ 12:46), Max: 37.4 (01-15-24 @ 18:50)  HR: 102 (01-16-24 @ 12:46) (87 - 106)  BP: 179/128 (01-16-24 @ 12:46) (145/105 - 200/123)  RR: 16 (01-16-24 @ 12:46) (16 - 18)  SpO2: 99% (01-16-24 @ 12:46) (98% - 100%)    PHYSICAL EXAM:  GENERAL: NA,obese   CHEST/LUNG: Clear to auscultation bilaterally; No wheeze  HEART: Regular rate and rhythm; No murmurs, rubs, or gallops  ABDOMEN: Soft, Nontender, Nondistended; Bowel sounds present  EXTREMITIES:   warm and well perfused, No clubbing, cyanosis, or edema  PSYCH: AAOx3  NEUROLOGY: non-focal, no tremors     LABS:                        14.0   8.00  )-----------( 164      ( 16 Jan 2024 07:00 )             43.2     01-16    137  |  99  |  9   ----------------------------<  93  3.6   |  25  |  0.91    Ca    8.2<L>      16 Jan 2024 07:00  Phos  2.6     01-16  Mg     1.90     01-16    TPro  7.1  /  Alb  3.9  /  TBili  1.5<H>  /  DBili  x   /  AST  20  /  ALT  11  /  AlkPhos  57  01-16    PT/INR - ( 15 Bertin 2024 21:57 )   PT: 12.5 sec;   INR: 1.11 ratio    PTT - ( 15 Bertin 2024 21:57 )  PTT:33.6 sec    Care Discussed with Consultants/Other Providers: Dr. Barkley

## 2024-01-16 NOTE — CONSULT NOTE ADULT - SUBJECTIVE AND OBJECTIVE BOX
Chief Complaint:  Patient is a 38y old  Male who presents with a chief complaint of Vomiting blood x 2 episodes (2024 03:03)    HPI:  38 year-old male, smoker, with history of alcohol use disorder, HTN (never taken medications); Reports no history of withdrawal seizures and no prior ICU admissions for alcohol withdrawal; Pt c/o 24 hours of nausea and 2 episodes of vomiting bright blood - volume estimated to 1/2 glass of bloody vomitus. Also reports a nosebleed that resolved and started at the same time as the vomiting. Last alcohol 23. Pt drinks 2 pints liquor daily with long-standing hx of binge drinking over past 15 years; drinks every 2 weeks. No NSAID hx; no FHx of GI malignancies; no antiplatelets or AC.    Allergies:  No Known Allergies    Home Medications:    Hospital Medications:  acetaminophen     Tablet .. 650 milliGRAM(s) Oral every 6 hours PRN  labetalol 100 milliGRAM(s) Oral every 8 hours  LORazepam   Injectable 1.5 milliGRAM(s) IV Push every 4 hours  LORazepam   Injectable   IV Push   LORazepam   Injectable 2 milliGRAM(s) IV Push every 2 hours PRN  melatonin 3 milliGRAM(s) Oral at bedtime PRN  nicotine -   7 mG/24Hr(s) Patch 1 Patch Transdermal daily  ondansetron Injectable 4 milliGRAM(s) IV Push every 8 hours PRN  pantoprazole  Injectable 40 milliGRAM(s) IV Push every 12 hours  thiamine IVPB 500 milliGRAM(s) IV Intermittent daily      PMHX/PSHX:  No pertinent past medical history    ETOH abuse    HTN (hypertension)    No significant past surgical history    Family history:  No pertinent family history in first degree relatives    No pertinent family history in first degree relatives    Denies family history of colon cancer/polyps, stomach cancer/polyps, pancreatic cancer/masses, liver cancer/disease, ovarian cancer and endometrial cancer.    Social History:     Tob: Denies  EtOH: as above  Illicit Drugs: Denies    ROS:  General:  No wt loss, fevers, chills  ENT:  No dysphagia  CV:  No pain, palpitations  Pulm:  No dyspnea, cough  GI:  No pain, (+) nausea, (+) vomiting, No diarrhea, No constipation, No rectal bleeding, No tarry stools, No dysphagia,  Muscle:  No pain, weakness  Neuro:  No weakness  Heme:  No ecchymosis  Skin:  No rash    PHYSICAL EXAM:     GENERAL:  No acute distress  HEENT:  no scleral icterus  CHEST:  no accessory muscle use  HEART:  Regular rate and rhythm  ABDOMEN:  Soft, non-tender, non-distended  EXTREMITIES: No edema  SKIN:  No rash/ecchymoses  NEURO:  Alert and oriented x 3    Vital Signs:  Vital Signs Last 24 Hrs  T(C): 37 (2024 12:46), Max: 37.4 (15 Bertin 2024 18:50)  T(F): 98.6 (2024 12:46), Max: 99.3 (15 Bertin 2024 18:50)  HR: 102 (2024 12:46) (87 - 106)  BP: 179/128 (2024 12:46) (145/105 - 200/123)  BP(mean): --  RR: 16 (2024 12:46) (16 - 18)  SpO2: 99% (2024 12:46) (98% - 100%)    Parameters below as of 2024 12:46  Patient On (Oxygen Delivery Method): room air      Daily     Daily     LABS:                        14.0   8.00  )-----------( 164      ( 2024 07:00 )             43.2     Mean Cell Volume: 78.1 fL (-24 @ 07:00)        137  |  99  |  9   ----------------------------<  93  3.6   |  25  |  0.91    Ca    8.2<L>      2024 07:00  Phos  2.6       Mg     1.90         TPro  7.1  /  Alb  3.9  /  TBili  1.5<H>  /  DBili  x   /  AST  20  /  ALT  11  /  AlkPhos  57      LIVER FUNCTIONS - ( 2024 07:00 )  Alb: 3.9 g/dL / Pro: 7.1 g/dL / ALK PHOS: 57 U/L / ALT: 11 U/L / AST: 20 U/L / GGT: x           PT/INR - ( 15 Bertin 2024 21:57 )   PT: 12.5 sec;   INR: 1.11 ratio         PTT - ( 15 Bertin 2024 21:57 )  PTT:33.6 sec  Urinalysis Basic - ( 2024 11:30 )    Color: Yellow / Appearance: Clear / S.017 / pH: x  Gluc: x / Ketone: 40 mg/dL  / Bili: Negative / Urobili: 1.0 mg/dL   Blood: x / Protein: Negative mg/dL / Nitrite: Negative   Leuk Esterase: Negative / RBC: x / WBC x   Sq Epi: x / Non Sq Epi: x / Bacteria: x      Amylase Serum--      Lipase serum20       Ammonia--                          14.0   8.00  )-----------( 164      ( 2024 07:00 )             43.2                         14.8   9.88  )-----------( 184      ( 15 Bertin 2024 21:57 )             45.2 HPI:  38 year-old male, smoker, with history of alcohol use disorder, HTN (never taken medications); Reports no history of withdrawal seizures and no prior ICU admissions for alcohol withdrawal; Pt c/o 24 hours of nausea and 2 episodes of vomiting bright blood - volume estimated to 1/2 glass of bloody vomitus. Also reports a nosebleed that resolved and started at the same time as the vomiting. Last alcohol 23. Pt drinks 2 pints liquor daily with long-standing hx of binge drinking over past 15 years; drinks every 2 weeks. No NSAID hx; no FHx of GI malignancies; no antiplatelets or AC.    Allergies:  No Known Allergies    Hospital Medications:  acetaminophen     Tablet .. 650 milliGRAM(s) Oral every 6 hours PRN  labetalol 100 milliGRAM(s) Oral every 8 hours  LORazepam   Injectable 1.5 milliGRAM(s) IV Push every 4 hours  LORazepam   Injectable   IV Push   LORazepam   Injectable 2 milliGRAM(s) IV Push every 2 hours PRN  melatonin 3 milliGRAM(s) Oral at bedtime PRN  nicotine -   7 mG/24Hr(s) Patch 1 Patch Transdermal daily  ondansetron Injectable 4 milliGRAM(s) IV Push every 8 hours PRN  pantoprazole  Injectable 40 milliGRAM(s) IV Push every 12 hours  thiamine IVPB 500 milliGRAM(s) IV Intermittent daily      PMHX/PSHX:  No pertinent past medical history    ETOH abuse    HTN (hypertension)    No significant past surgical history    ROS:  General:  No wt loss, fevers, chills  ENT:  No dysphagia  CV:  No pain, palpitations  Pulm:  No dyspnea, cough  GI:  No pain, (+) nausea, (+) vomiting, No diarrhea, No constipation, No rectal bleeding, No tarry stools, No dysphagia,  Muscle:  No pain, weakness  Neuro:  No weakness  Heme:  No ecchymosis  Skin:  No rash    PHYSICAL EXAM:     GENERAL:  No acute distress  HEENT:  no scleral icterus  CHEST:  no accessory muscle use  HEART:  Regular rate and rhythm  ABDOMEN:  Soft, non-tender, non-distended  EXTREMITIES: No edema  SKIN:  No rash/ecchymoses  NEURO:  Alert and oriented x 3    Vital Signs:  Vital Signs Last 24 Hrs  T(C): 37 (2024 12:46), Max: 37.4 (15 Bertin 2024 18:50)  T(F): 98.6 (2024 12:46), Max: 99.3 (15 Bertin 2024 18:50)  HR: 102 (2024 12:46) (87 - 106)  BP: 179/128 (2024 12:46) (145/105 - 200/123)  BP(mean): --  RR: 16 (2024 12:46) (16 - 18)  SpO2: 99% (2024 12:46) (98% - 100%)    Parameters below as of 2024 12:46  Patient On (Oxygen Delivery Method): room air      Daily     Daily     LABS:                        14.0   8.00  )-----------( 164      ( 2024 07:00 )             43.2     Mean Cell Volume: 78.1 fL (24 @ 07:00)        137  |  99  |  9   ----------------------------<  93  3.6   |  25  |  0.91    Ca    8.2<L>      2024 07:00  Phos  2.6       Mg     1.90         TPro  7.1  /  Alb  3.9  /  TBili  1.5<H>  /  DBili  x   /  AST  20  /  ALT  11  /  AlkPhos  57  16    LIVER FUNCTIONS - ( 2024 07:00 )  Alb: 3.9 g/dL / Pro: 7.1 g/dL / ALK PHOS: 57 U/L / ALT: 11 U/L / AST: 20 U/L / GGT: x           PT/INR - ( 15 Bertin 2024 21:57 )   PT: 12.5 sec;   INR: 1.11 ratio         PTT - ( 15 Bertin 2024 21:57 )  PTT:33.6 sec  Urinalysis Basic - ( 2024 11:30 )    Color: Yellow / Appearance: Clear / S.017 / pH: x  Gluc: x / Ketone: 40 mg/dL  / Bili: Negative / Urobili: 1.0 mg/dL   Blood: x / Protein: Negative mg/dL / Nitrite: Negative   Leuk Esterase: Negative / RBC: x / WBC x   Sq Epi: x / Non Sq Epi: x / Bacteria: x      Amylase Serum--      Lipase serum20       Ammonia--                          14.0   8.00  )-----------( 164      ( 2024 07:00 )             43.2                         14.8   9.88  )-----------( 184      ( 15 Bertin 2024 21:57 )             45.2 HPI:  38 year-old male, smoker, with history of alcohol use disorder, HTN (never taken medications); Reports no history of withdrawal seizures and no prior ICU admissions for alcohol withdrawal; Pt c/o 24 hours of nausea and 2 episodes of vomiting bright blood - volume estimated to 1/2 glass of bloody vomitus. Also reports a nosebleed that resolved and started at the same time as the vomiting. Last alcohol 23. Pt drinks 2 pints liquor daily with long-standing hx of binge drinking over past 15 years; drinks every 2 weeks. No NSAID hx; no FHx of GI malignancies; no antiplatelets or AC.    Allergies:  No Known Allergies    Hospital Medications:  acetaminophen     Tablet .. 650 milliGRAM(s) Oral every 6 hours PRN  labetalol 100 milliGRAM(s) Oral every 8 hours  LORazepam   Injectable 1.5 milliGRAM(s) IV Push every 4 hours  LORazepam   Injectable   IV Push   LORazepam   Injectable 2 milliGRAM(s) IV Push every 2 hours PRN  melatonin 3 milliGRAM(s) Oral at bedtime PRN  nicotine -   7 mG/24Hr(s) Patch 1 Patch Transdermal daily  ondansetron Injectable 4 milliGRAM(s) IV Push every 8 hours PRN  pantoprazole  Injectable 40 milliGRAM(s) IV Push every 12 hours  thiamine IVPB 500 milliGRAM(s) IV Intermittent daily      PMHX/PSHX:  No pertinent past medical history    ETOH abuse    HTN (hypertension)    No significant past surgical history    ROS:  General:  No wt loss, fevers, chills  ENT:  No dysphagia  CV:  No pain, palpitations  Pulm:  No dyspnea, cough  GI:  No pain, (+) nausea, (+) vomiting, No diarrhea, No constipation, No rectal bleeding, No tarry stools, No dysphagia,  Muscle:  No pain, weakness  Neuro:  No weakness  Heme:  No ecchymosis  Skin:  No rash    PHYSICAL EXAM:     GENERAL:  No acute distress  HEENT:  no scleral icterus  CHEST:  no accessory muscle use  HEART:  Regular rate and rhythm  ABDOMEN:  Soft, non-tender, non-distended  EXTREMITIES: No edema  SKIN:  No rash/ecchymoses  NEURO:  Alert and oriented x 3    Vital Signs:  Vital Signs Last 24 Hrs  T(C): 37 (2024 12:46), Max: 37.4 (15 Bertin 2024 18:50)  T(F): 98.6 (2024 12:46), Max: 99.3 (15 Bertin 2024 18:50)  HR: 102 (2024 12:46) (87 - 106)  BP: 179/128 (2024 12:46) (145/105 - 200/123)  BP(mean): --  RR: 16 (2024 12:46) (16 - 18)  SpO2: 99% (2024 12:46) (98% - 100%)    Parameters below as of 2024 12:46  Patient On (Oxygen Delivery Method): room air      Daily     Daily     LABS:                        14.0   8.00  )-----------( 164      ( 2024 07:00 )             43.2     Mean Cell Volume: 78.1 fL (24 @ 07:00)        137  |  99  |  9   ----------------------------<  93  3.6   |  25  |  0.91    Ca    8.2<L>      2024 07:00  Phos  2.6       Mg     1.90         TPro  7.1  /  Alb  3.9  /  TBili  1.5<H>  /  DBili  x   /  AST  20  /  ALT  11  /  AlkPhos  57  16    LIVER FUNCTIONS - ( 2024 07:00 )  Alb: 3.9 g/dL / Pro: 7.1 g/dL / ALK PHOS: 57 U/L / ALT: 11 U/L / AST: 20 U/L / GGT: x           PT/INR - ( 15 Bertin 2024 21:57 )   PT: 12.5 sec;   INR: 1.11 ratio         PTT - ( 15 Betrin 2024 21:57 )  PTT:33.6 sec  Urinalysis Basic - ( 2024 11:30 )    Color: Yellow / Appearance: Clear / S.017 / pH: x  Gluc: x / Ketone: 40 mg/dL  / Bili: Negative / Urobili: 1.0 mg/dL   Blood: x / Protein: Negative mg/dL / Nitrite: Negative   Leuk Esterase: Negative / RBC: x / WBC x   Sq Epi: x / Non Sq Epi: x / Bacteria: x      Amylase Serum--      Lipase serum20       Ammonia--                          14.0   8.00  )-----------( 164      ( 2024 07:00 )             43.2                         14.8   9.88  )-----------( 184      ( 15 Bertin 2024 21:57 )             45.2

## 2024-01-16 NOTE — CONSULT NOTE ADULT - ATTENDING COMMENTS
# Hematemesis prior: No recurrent episodes after admission. Hgb normal. No accelerated azotemia. Labs not suggestive of portal HTN. Suspect possibly from ingested blood from epistaxis vs underlying esophagitis vs MWT vs PUD.   # Epistaxis  # Alcohol use disorder c/b withdrawal on ativan taper  # Elevated T Bili    --Please check direct bilirubin and trend liver tests  --Monitor CBC and transfuse PRN to maintain Hgb > 7  --PPI 40mg BID  --Diet as tolerated  --CIWA protocol and ativan taper per primary team  --Suggest EGD to evaluate etiology of recent hematemesis, but will await completion of ativan taper. If no further bleeding while admitted and Hgb remains stable, can consider expedited follow up for outpatient EGD if patient's preference.     Additional recommendations as above. Discussed with primary team attending, Dr. Sales. Please reach out if questions, concerns or acute change in clinical status as more urgent endoscopic evaluation may be warranted.

## 2024-01-17 ENCOUNTER — TRANSCRIPTION ENCOUNTER (OUTPATIENT)
Age: 39
End: 2024-01-17

## 2024-01-17 LAB
ALBUMIN SERPL ELPH-MCNC: 4.2 G/DL — SIGNIFICANT CHANGE UP (ref 3.3–5)
ALP SERPL-CCNC: 61 U/L — SIGNIFICANT CHANGE UP (ref 40–120)
ALT FLD-CCNC: 12 U/L — SIGNIFICANT CHANGE UP (ref 4–41)
ANION GAP SERPL CALC-SCNC: 12 MMOL/L — SIGNIFICANT CHANGE UP (ref 7–14)
ANION GAP SERPL CALC-SCNC: 14 MMOL/L — SIGNIFICANT CHANGE UP (ref 7–14)
AST SERPL-CCNC: 34 U/L — SIGNIFICANT CHANGE UP (ref 4–40)
BILIRUB DIRECT SERPL-MCNC: 0.3 MG/DL — SIGNIFICANT CHANGE UP (ref 0–0.3)
BILIRUB INDIRECT FLD-MCNC: 0.8 MG/DL — SIGNIFICANT CHANGE UP (ref 0–1)
BILIRUB SERPL-MCNC: 1.1 MG/DL — SIGNIFICANT CHANGE UP (ref 0.2–1.2)
BUN SERPL-MCNC: 7 MG/DL — SIGNIFICANT CHANGE UP (ref 7–23)
BUN SERPL-MCNC: 8 MG/DL — SIGNIFICANT CHANGE UP (ref 7–23)
CALCIUM SERPL-MCNC: 8.9 MG/DL — SIGNIFICANT CHANGE UP (ref 8.4–10.5)
CALCIUM SERPL-MCNC: 9 MG/DL — SIGNIFICANT CHANGE UP (ref 8.4–10.5)
CHLORIDE SERPL-SCNC: 101 MMOL/L — SIGNIFICANT CHANGE UP (ref 98–107)
CHLORIDE SERPL-SCNC: 102 MMOL/L — SIGNIFICANT CHANGE UP (ref 98–107)
CO2 SERPL-SCNC: 25 MMOL/L — SIGNIFICANT CHANGE UP (ref 22–31)
CO2 SERPL-SCNC: 26 MMOL/L — SIGNIFICANT CHANGE UP (ref 22–31)
CREAT SERPL-MCNC: 0.92 MG/DL — SIGNIFICANT CHANGE UP (ref 0.5–1.3)
CREAT SERPL-MCNC: 0.94 MG/DL — SIGNIFICANT CHANGE UP (ref 0.5–1.3)
EGFR: 107 ML/MIN/1.73M2 — SIGNIFICANT CHANGE UP
EGFR: 110 ML/MIN/1.73M2 — SIGNIFICANT CHANGE UP
GLUCOSE SERPL-MCNC: 109 MG/DL — HIGH (ref 70–99)
GLUCOSE SERPL-MCNC: 110 MG/DL — HIGH (ref 70–99)
HCT VFR BLD CALC: 44.8 % — SIGNIFICANT CHANGE UP (ref 39–50)
HGB BLD-MCNC: 14.3 G/DL — SIGNIFICANT CHANGE UP (ref 13–17)
MAGNESIUM SERPL-MCNC: 2.3 MG/DL — SIGNIFICANT CHANGE UP (ref 1.6–2.6)
MCHC RBC-ENTMCNC: 25.5 PG — LOW (ref 27–34)
MCHC RBC-ENTMCNC: 31.9 GM/DL — LOW (ref 32–36)
MCV RBC AUTO: 79.9 FL — LOW (ref 80–100)
NRBC # BLD: 0 /100 WBCS — SIGNIFICANT CHANGE UP (ref 0–0)
NRBC # FLD: 0 K/UL — SIGNIFICANT CHANGE UP (ref 0–0)
PHOSPHATE SERPL-MCNC: 2.8 MG/DL — SIGNIFICANT CHANGE UP (ref 2.5–4.5)
PLATELET # BLD AUTO: 178 K/UL — SIGNIFICANT CHANGE UP (ref 150–400)
POTASSIUM SERPL-MCNC: 3.8 MMOL/L — SIGNIFICANT CHANGE UP (ref 3.5–5.3)
POTASSIUM SERPL-MCNC: 3.9 MMOL/L — SIGNIFICANT CHANGE UP (ref 3.5–5.3)
POTASSIUM SERPL-SCNC: 3.8 MMOL/L — SIGNIFICANT CHANGE UP (ref 3.5–5.3)
POTASSIUM SERPL-SCNC: 3.9 MMOL/L — SIGNIFICANT CHANGE UP (ref 3.5–5.3)
PROT SERPL-MCNC: 7.7 G/DL — SIGNIFICANT CHANGE UP (ref 6–8.3)
RBC # BLD: 5.61 M/UL — SIGNIFICANT CHANGE UP (ref 4.2–5.8)
RBC # FLD: 19.4 % — HIGH (ref 10.3–14.5)
SODIUM SERPL-SCNC: 139 MMOL/L — SIGNIFICANT CHANGE UP (ref 135–145)
SODIUM SERPL-SCNC: 141 MMOL/L — SIGNIFICANT CHANGE UP (ref 135–145)
WBC # BLD: 7.86 K/UL — SIGNIFICANT CHANGE UP (ref 3.8–10.5)
WBC # FLD AUTO: 7.86 K/UL — SIGNIFICANT CHANGE UP (ref 3.8–10.5)

## 2024-01-17 PROCEDURE — 99233 SBSQ HOSP IP/OBS HIGH 50: CPT

## 2024-01-17 RX ORDER — LABETALOL HCL 100 MG
2.5 TABLET ORAL ONCE
Refills: 0 | Status: COMPLETED | OUTPATIENT
Start: 2024-01-17 | End: 2024-01-17

## 2024-01-17 RX ORDER — LABETALOL HCL 100 MG
200 TABLET ORAL EVERY 8 HOURS
Refills: 0 | Status: DISCONTINUED | OUTPATIENT
Start: 2024-01-17 | End: 2024-01-18

## 2024-01-17 RX ORDER — NICOTINE POLACRILEX 2 MG
1 GUM BUCCAL DAILY
Refills: 0 | Status: DISCONTINUED | OUTPATIENT
Start: 2024-01-17 | End: 2024-01-18

## 2024-01-17 RX ADMIN — PANTOPRAZOLE SODIUM 40 MILLIGRAM(S): 20 TABLET, DELAYED RELEASE ORAL at 17:10

## 2024-01-17 RX ADMIN — Medication 105 MILLIGRAM(S): at 09:49

## 2024-01-17 RX ADMIN — Medication 1 PATCH: at 21:16

## 2024-01-17 RX ADMIN — Medication 200 MILLIGRAM(S): at 21:19

## 2024-01-17 RX ADMIN — Medication 200 MILLIGRAM(S): at 12:31

## 2024-01-17 RX ADMIN — Medication 1 MILLIGRAM(S): at 09:50

## 2024-01-17 RX ADMIN — Medication 1 PATCH: at 12:31

## 2024-01-17 RX ADMIN — PANTOPRAZOLE SODIUM 40 MILLIGRAM(S): 20 TABLET, DELAYED RELEASE ORAL at 05:34

## 2024-01-17 RX ADMIN — Medication 1 PATCH: at 12:30

## 2024-01-17 RX ADMIN — Medication 100 MILLIGRAM(S): at 05:34

## 2024-01-17 RX ADMIN — Medication 25 MILLIGRAM(S): at 21:19

## 2024-01-17 RX ADMIN — Medication 25 MILLIGRAM(S): at 12:30

## 2024-01-17 RX ADMIN — Medication 2.5 MILLIGRAM(S): at 02:54

## 2024-01-17 RX ADMIN — Medication 1.5 MILLIGRAM(S): at 02:55

## 2024-01-17 RX ADMIN — Medication 1 MILLIGRAM(S): at 05:35

## 2024-01-17 NOTE — PROGRESS NOTE ADULT - SUBJECTIVE AND OBJECTIVE BOX
Patient is a 38y old  Male who presents with a chief complaint of Vomiting blood x 2 episodes     SUBJECTIVE / OVERNIGHT EVENTS:    no CP, SOB, f/c/n/v  states he feels OK  states always has high BP    MEDICATIONS  (STANDING):  labetalol 200 milliGRAM(s) Oral every 8 hours  LORazepam   Injectable 1 milliGRAM(s) IV Push every 4 hours  LORazepam   Injectable   IV Push   nicotine -   7 mG/24Hr(s) Patch 1 Patch Transdermal daily  pantoprazole  Injectable 40 milliGRAM(s) IV Push every 12 hours  thiamine IVPB 500 milliGRAM(s) IV Intermittent daily    MEDICATIONS  (PRN):  acetaminophen     Tablet .. 650 milliGRAM(s) Oral every 6 hours PRN Temp greater or equal to 38C (100.4F), Mild Pain (1 - 3)  LORazepam   Injectable 2 milliGRAM(s) IV Push every 2 hours PRN Symptom-triggered: each CIWA -Ar score 8 or GREATER  melatonin 3 milliGRAM(s) Oral at bedtime PRN Insomnia  ondansetron Injectable 4 milliGRAM(s) IV Push every 8 hours PRN Nausea and/or Vomiting    T(C): 36.9 (01-17-24 @ 09:48), Max: 37 (01-16-24 @ 12:46)  HR: 96 (01-17-24 @ 09:48) (96 - 109)  BP: 153/102 (01-17-24 @ 09:48) (142/87 - 179/128)  RR: 18 (01-17-24 @ 09:48) (16 - 20)  SpO2: 96% (01-17-24 @ 09:48) (95% - 99%)    PHYSICAL EXAM:  GENERAL: NA,obese   CHEST/LUNG: Clear to auscultation bilaterally; No wheeze  HEART: Regular rate and rhythm; No murmurs, rubs, or gallops  ABDOMEN: Soft, Nontender, Nondistended; Bowel sounds present  EXTREMITIES:   warm and well perfused, No clubbing, cyanosis, or edema  PSYCH: AAOx3  NEUROLOGY: non-focal, no tremors     LABS:                        14.3   7.86  )-----------( 178      ( 17 Jan 2024 05:49 )             44.8     01-17    139  |  101  |  7   ----------------------------<  109<H>  3.8   |  26  |  0.92    Ca    8.9      17 Jan 2024 05:49  Phos  2.8     01-17  Mg     2.30     01-17    TPro  7.1  /  Alb  3.9  /  TBili  1.5<H>  /  DBili  x   /  AST  20  /  ALT  11  /  AlkPhos  57  01-16    PT/INR - ( 15 Bertin 2024 21:57 )   PT: 12.5 sec;   INR: 1.11 ratio    PTT - ( 15 Bertin 2024 21:57 )  PTT:33.6 sec    Care Discussed with Consultants/Other Providers:

## 2024-01-17 NOTE — PROVIDER CONTACT NOTE (OTHER) - RECOMMENDATIONS
As per provider Krista Ibarra (#[id number]) no further interventions needed at this time.
As per provider Krista Ibarra (#[id number]) will consult the attending. No further interventions needed at this time.
As per provider Krista Ibarra (#[id number]) will consult the attending. Recheck BP

## 2024-01-17 NOTE — DISCHARGE NOTE PROVIDER - NSDCMRMEDTOKEN_GEN_ALL_CORE_FT
folic acid 1 mg oral tablet: 1 tab(s) orally once a day  Multiple Vitamins oral tablet: 1 tab(s) orally once a day   thiamine 100 mg oral tablet: 1 tab(s) orally once a day   folic acid 1 mg oral tablet: 1 tab(s) orally once a day  labetalol 300 mg oral tablet: 1 tab(s) orally every 8 hours  Multiple Vitamins oral tablet: 1 tab(s) orally once a day   pantoprazole 40 mg oral delayed release tablet: 1 tab(s) orally once a day  thiamine 100 mg oral tablet: 1 tab(s) orally once a day   folic acid 1 mg oral tablet: 1 tab(s) orally once a day  labetalol 300 mg oral tablet: 1 tab(s) orally every 8 hours  Multiple Vitamins oral tablet: 1 tab(s) orally once a day   nicotine 14 mg/24 hr transdermal film, extended release: 1 patch transdermal once a day  pantoprazole 40 mg oral delayed release tablet: 1 tab(s) orally once a day  thiamine 100 mg oral tablet: 1 tab(s) orally once a day

## 2024-01-17 NOTE — DISCHARGE NOTE PROVIDER - ATTENDING DISCHARGE PHYSICAL EXAMINATION:
VITALS: T(C): 36.5 (01-19-24 @ 17:31), Max: 36.7 (01-19-24 @ 09:47)  HR: 77 (01-19-24 @ 17:31) (77 - 103)  BP: 136/95 (01-19-24 @ 17:31) (119/70 - 150/99)  RR: 18 (01-19-24 @ 17:31) (18 - 21)  SpO2: 100% (01-19-24 @ 17:31) (95% - 100%)  GENERAL: NAD, alert, resting comfortably  HEAD:  Atraumatic, Normocephalic  EYES: conjunctiva and sclera clear  NECK: Supple, No JVD  CHEST/LUNG: Clear to auscultation bilaterally; No wheeze, ronchi or rales  HEART: Regular rate and rhythm; No murmurs, rubs, or gallops  ABDOMEN: Soft, Nontender, Nondistended; Bowel sounds present  EXTREMITIES:  No clubbing, cyanosis, or edema  PSYCH: AAOx3, normal behavior, normal affect  NEUROLOGY: non-focal, normal strength, normal sensation  SKIN: No rashes or lesions

## 2024-01-17 NOTE — DISCHARGE NOTE PROVIDER - NSDCCPCAREPLAN_GEN_ALL_CORE_FT
PRINCIPAL DISCHARGE DIAGNOSIS  Diagnosis: Alcohol dependence with withdrawal  Assessment and Plan of Treatment:       SECONDARY DISCHARGE DIAGNOSES  Diagnosis: Hematemesis  Assessment and Plan of Treatment:     Diagnosis: HTN (hypertension)  Assessment and Plan of Treatment:      PRINCIPAL DISCHARGE DIAGNOSIS  Diagnosis: Alcohol dependence with withdrawal  Assessment and Plan of Treatment: You drink heavily.  Here you underwent pharmacological detox.  You should refrain from drinking as long term this can lead to organ damage and complications.    You are interested in outpatient programs.      SECONDARY DISCHARGE DIAGNOSES  Diagnosis: Hematemesis  Assessment and Plan of Treatment: You reported bleeding in your vomit.  It did not recurr here.  You were given acid mediation.   Seen by GI team.    Diagnosis: HTN (hypertension)  Assessment and Plan of Treatment: Please do not drink and take your blood pressure medications.     PRINCIPAL DISCHARGE DIAGNOSIS  Diagnosis: Alcohol dependence with withdrawal  Assessment and Plan of Treatment: You drink heavily. Here you underwent pharmacological detox.  You should refrain from drinking as long term this can lead to organ damage and complications. You are interested in outpatient programs.      SECONDARY DISCHARGE DIAGNOSES  Diagnosis: Hematemesis  Assessment and Plan of Treatment: You reported bleeding in your vomit.  It did not recurr here.  You were given acid medication. You were seen by gastroenterology team, and you had an endoscopy which showed non-bleeding gastric ulcers. Continue taking protonix 40mg daily for 4 weeks.      Diagnosis: HTN (hypertension)  Assessment and Plan of Treatment: Please do not drink and take your blood pressure medications.     PRINCIPAL DISCHARGE DIAGNOSIS  Diagnosis: Alcohol dependence with withdrawal  Assessment and Plan of Treatment: You drink heavily. Here you underwent pharmacological detox.  You should refrain from drinking as long term this can lead to organ damage and complications. You are interested in outpatient programs.      SECONDARY DISCHARGE DIAGNOSES  Diagnosis: Hematemesis  Assessment and Plan of Treatment: You reported bleeding in your vomit.  It did not recurr here.  You were given acid medication. You were seen by gastroenterology team, and you had an endoscopy which showed non-bleeding gastric ulcers. Continue taking protonix 40mg daily for 4 weeks.      Diagnosis: HTN (hypertension)  Assessment and Plan of Treatment: Please do not drink. Please continue taking the blood pressure medication as prescribed.

## 2024-01-17 NOTE — DISCHARGE NOTE PROVIDER - HOSPITAL COURSE
38M smoker, with history of alcohol use disorder, HTN (never taken medications); Reports no history of withdrawal seizures and no prior ICU admissions for alcohol withdrawal; Pt c/o 24 hours of nausea and 2 episodes of vomiting bright blood - volume estimated to 1/2 glass of bloody vomitus. Also reports a nosebleed that resolved. Last alcohol ingestion 48 hours ago. Has been drinking 2 pints daily over the past several days. Stopped using alcohol 2 days ago, would like to go to rehab. In the recent past, when attempting to quit alcohol use, would develop tremors, severe sweating, anxiety and auditory hallucinations.       Hospital Course:  Admitted to medicine, treated with CIWA protocol, ativan taper IV (NPO) and transitioned to librium.  Hb remained stable, no further bleeding while here, seen by GI, EGD pending detox, IP vs expedited OP.   Also with HTN, treated with PO labetalol.    Dc home  No PT needs 38M smoker, with history of alcohol use disorder, HTN (never taken medications); Reports no history of withdrawal seizures and no prior ICU admissions for alcohol withdrawal; Pt c/o 24 hours of nausea and 2 episodes of vomiting bright blood - volume estimated to 1/2 glass of bloody vomitus. Also reports a nosebleed that resolved. Last alcohol ingestion 48 hours ago. Has been drinking 2 pints daily over the past several days. Stopped using alcohol 2 days ago, would like to go to rehab. In the recent past, when attempting to quit alcohol use, would develop tremors, severe sweating, anxiety and auditory hallucinations.       Hospital Course:  Admitted to medicine, treated with CIWA protocol, ativan taper IV (NPO) and transitioned to librium.  Hb remained stable, no further bleeding while here, seen by GI, EGD pending detox, planned for 1/19.   Also with HTN, treated with PO labetalol.    Dc home  No PT needs 38M smoker, with history of alcohol use disorder, HTN (never taken medications); Reports no history of withdrawal seizures and no prior ICU admissions for alcohol withdrawal; Pt c/o 24 hours of nausea and 2 episodes of vomiting bright blood - volume estimated to 1/2 glass of bloody vomitus. Also reports a nosebleed that resolved. Last alcohol ingestion 48 hours ago. Has been drinking 2 pints daily over the past several days. Stopped using alcohol 2 days ago, would like to go to rehab. In the recent past, when attempting to quit alcohol use, would develop tremors, severe sweating, anxiety and auditory hallucinations.       Alcohol dependence with withdrawal.   Heavy daily intake c/w tolerance and dependence;   - c/w CIWA, on ativan taper, now taking PO will change to librium to end this evening   - c/w Thiamine high dose IVPB x5 days    - seizure, fall precautions.    Hematemesis.   Acute, 2 episodes of bright blood emesis, volume estimated by the pt = 1/2 glass x 2  - denies recurrence here, Hb stable  - c/w Protonix IV BID  - trend Hb, active T&S, transfuse <7  - GI following   - no plan for EGD until s/p taper  - s/p EGD 1/19: normal esophagus, non-bleeding erosive gastropathy, non-bleeding gastric ulcers with no stigmata of bleeding.     Hyperbilirubinemia.    LFTs wnl, low suspicion for biliary obstructive process  - normalized.    HTN (hypertension).    Uncontrolled, exacerbated by alcohol withdrawal; was not compliant with home Lisinopril and HCTZ  - c/w Labetalol, increase to 300 mg TID (likely an dc on 400 mg BID)  -  monitor BP closely  - reports he has a PCP, will provide name when has his cellphone.    Smoker.   - Nicotine patch, increase dose as pt states no effective.     38M smoker, with history of alcohol use disorder, HTN (never taken medications); Reports no history of withdrawal seizures and no prior ICU admissions for alcohol withdrawal; Pt c/o 24 hours of nausea and 2 episodes of vomiting bright blood - volume estimated to 1/2 glass of bloody vomitus. Also reports a nosebleed that resolved. Last alcohol ingestion 48 hours ago. Has been drinking 2 pints daily over the past several days. Stopped using alcohol 2 days ago, would like to go to rehab. In the recent past, when attempting to quit alcohol use, would develop tremors, severe sweating, anxiety and auditory hallucinations. Patient completed benzo taper for alcohol withdraw. Went for EGD 1/19 with GI for hematemesis which shows: normal esophagus, non-bleeding erosive gastropathy, non-bleeding gastric ulcers with no stigmata of bleeding. GI did biopsy and recommended daily PPI for 4 weeks. Hyperbilirubinemia normalized. HTN poorly controlled, started on labetalol. Nicotine patch for cigarette craving.

## 2024-01-17 NOTE — PROVIDER CONTACT NOTE (OTHER) - BACKGROUND
38 year old male admitted for alcohol dependence with withdrawal
Admitted for ETOH withdrawal

## 2024-01-17 NOTE — PROVIDER CONTACT NOTE (OTHER) - ASSESSMENT
/102; patient denies headache, blurred vision, chest pain or shortness of breath.
/108; patient denies headache, blurred vision, chest pain, and shortness of breath
Patient is A&Ox4, Patient denies pain, chest pain, shortness of breath, nausea, vomiting or dizziness.
Patient is A&Ox4, Patient denies pain, chest pain, shortness of breath, nausea, vomiting or dizziness.
/110 
Patient is A&Ox4, patient denies pain, chest pain, shortness of breath, nausea, vomiting or dizziness.

## 2024-01-17 NOTE — DISCHARGE NOTE PROVIDER - CARE PROVIDER_API CALL
Your outpatient primary care physician,   Phone: (   )    -  Fax: (   )    -  Follow Up Time:    Your outpatient primary care physician,   Phone: (   )    -  Fax: (   )    -  Follow Up Time:     Curtis Barkley  Gastroenterology  [address] [place] [id number]  Phone: [phone]  Fax: [phone]  Follow Up Time:

## 2024-01-17 NOTE — PROVIDER CONTACT NOTE (OTHER) - ACTION/TREATMENT ORDERED:
Provider made aware; will order Labetalol and recheck blood pressure.
As per provider Krista Ibarra (#[id number]) no further interventions needed at this time.
As per provider Krista Ibarra (#[id number]) will consult the attending. No further interventions needed at this time.
Provider made aware; will monitor BP and continue CIWA assessments.
Provider made aware; will order .5mL Labetalol.
As per provider Krista Ibarra (#[id number]) will consult the attending. Recheck BP

## 2024-01-17 NOTE — PROVIDER CONTACT NOTE (OTHER) - NAME OF MD/NP/PA/DO NOTIFIED:
Krista Ibarra
Krista Ibarra (#[id number])
[facility] #[id number] DEBRA Jamison
[facility] #[id number] DEBRA Jamison
Krista Ibarra (#[id number])
Krista Ibarra (#[id number])

## 2024-01-17 NOTE — DISCHARGE NOTE PROVIDER - PROVIDER TOKENS
FREE:[LAST:[Your outpatient primary care physician],PHONE:[(   )    -],FAX:[(   )    -]] FREE:[LAST:[Your outpatient primary care physician],PHONE:[(   )    -],FAX:[(   )    -]],PROVIDER:[TOKEN:[[id number]]]

## 2024-01-17 NOTE — DISCHARGE NOTE PROVIDER - CARE PROVIDERS DIRECT ADDRESSES
,DirectAddress_Unknown ,DirectAddress_Unknown,[email or web address]

## 2024-01-17 NOTE — PROVIDER CONTACT NOTE (OTHER) - DATE AND TIME:
17-Jan-2024 11:25
17-Jan-2024 14:12
17-Jan-2024 06:57
17-Jan-2024 04:09
17-Jan-2024 06:31
17-Jan-2024 13:20

## 2024-01-18 LAB
ALBUMIN SERPL ELPH-MCNC: 4.1 G/DL — SIGNIFICANT CHANGE UP (ref 3.3–5)
ALP SERPL-CCNC: 63 U/L — SIGNIFICANT CHANGE UP (ref 40–120)
ALT FLD-CCNC: 15 U/L — SIGNIFICANT CHANGE UP (ref 4–41)
ANION GAP SERPL CALC-SCNC: 15 MMOL/L — HIGH (ref 7–14)
ANION GAP SERPL CALC-SCNC: 16 MMOL/L — HIGH (ref 7–14)
AST SERPL-CCNC: 28 U/L — SIGNIFICANT CHANGE UP (ref 4–40)
BILIRUB DIRECT SERPL-MCNC: <0.2 MG/DL — SIGNIFICANT CHANGE UP (ref 0–0.3)
BILIRUB INDIRECT FLD-MCNC: >0.7 MG/DL — SIGNIFICANT CHANGE UP (ref 0–1)
BILIRUB SERPL-MCNC: 0.9 MG/DL — SIGNIFICANT CHANGE UP (ref 0.2–1.2)
BUN SERPL-MCNC: 10 MG/DL — SIGNIFICANT CHANGE UP (ref 7–23)
BUN SERPL-MCNC: 10 MG/DL — SIGNIFICANT CHANGE UP (ref 7–23)
CALCIUM SERPL-MCNC: 8.9 MG/DL — SIGNIFICANT CHANGE UP (ref 8.4–10.5)
CALCIUM SERPL-MCNC: 8.9 MG/DL — SIGNIFICANT CHANGE UP (ref 8.4–10.5)
CHLORIDE SERPL-SCNC: 101 MMOL/L — SIGNIFICANT CHANGE UP (ref 98–107)
CHLORIDE SERPL-SCNC: 102 MMOL/L — SIGNIFICANT CHANGE UP (ref 98–107)
CO2 SERPL-SCNC: 22 MMOL/L — SIGNIFICANT CHANGE UP (ref 22–31)
CO2 SERPL-SCNC: 22 MMOL/L — SIGNIFICANT CHANGE UP (ref 22–31)
CREAT SERPL-MCNC: 0.94 MG/DL — SIGNIFICANT CHANGE UP (ref 0.5–1.3)
CREAT SERPL-MCNC: 0.94 MG/DL — SIGNIFICANT CHANGE UP (ref 0.5–1.3)
EGFR: 107 ML/MIN/1.73M2 — SIGNIFICANT CHANGE UP
EGFR: 107 ML/MIN/1.73M2 — SIGNIFICANT CHANGE UP
GLUCOSE SERPL-MCNC: 135 MG/DL — HIGH (ref 70–99)
GLUCOSE SERPL-MCNC: 136 MG/DL — HIGH (ref 70–99)
HCT VFR BLD CALC: 44.5 % — SIGNIFICANT CHANGE UP (ref 39–50)
HGB BLD-MCNC: 14.3 G/DL — SIGNIFICANT CHANGE UP (ref 13–17)
MAGNESIUM SERPL-MCNC: 2.1 MG/DL — SIGNIFICANT CHANGE UP (ref 1.6–2.6)
MAGNESIUM SERPL-MCNC: 2.1 MG/DL — SIGNIFICANT CHANGE UP (ref 1.6–2.6)
MCHC RBC-ENTMCNC: 25.8 PG — LOW (ref 27–34)
MCHC RBC-ENTMCNC: 32.1 GM/DL — SIGNIFICANT CHANGE UP (ref 32–36)
MCV RBC AUTO: 80.2 FL — SIGNIFICANT CHANGE UP (ref 80–100)
NRBC # BLD: 0 /100 WBCS — SIGNIFICANT CHANGE UP (ref 0–0)
NRBC # FLD: 0 K/UL — SIGNIFICANT CHANGE UP (ref 0–0)
PHOSPHATE SERPL-MCNC: 2.9 MG/DL — SIGNIFICANT CHANGE UP (ref 2.5–4.5)
PHOSPHATE SERPL-MCNC: 2.9 MG/DL — SIGNIFICANT CHANGE UP (ref 2.5–4.5)
PLATELET # BLD AUTO: 177 K/UL — SIGNIFICANT CHANGE UP (ref 150–400)
POTASSIUM SERPL-MCNC: 3.4 MMOL/L — LOW (ref 3.5–5.3)
POTASSIUM SERPL-MCNC: 3.5 MMOL/L — SIGNIFICANT CHANGE UP (ref 3.5–5.3)
POTASSIUM SERPL-SCNC: 3.4 MMOL/L — LOW (ref 3.5–5.3)
POTASSIUM SERPL-SCNC: 3.5 MMOL/L — SIGNIFICANT CHANGE UP (ref 3.5–5.3)
PROT SERPL-MCNC: 7.5 G/DL — SIGNIFICANT CHANGE UP (ref 6–8.3)
RBC # BLD: 5.55 M/UL — SIGNIFICANT CHANGE UP (ref 4.2–5.8)
RBC # FLD: 19.8 % — HIGH (ref 10.3–14.5)
SODIUM SERPL-SCNC: 138 MMOL/L — SIGNIFICANT CHANGE UP (ref 135–145)
SODIUM SERPL-SCNC: 140 MMOL/L — SIGNIFICANT CHANGE UP (ref 135–145)
WBC # BLD: 8.08 K/UL — SIGNIFICANT CHANGE UP (ref 3.8–10.5)
WBC # FLD AUTO: 8.08 K/UL — SIGNIFICANT CHANGE UP (ref 3.8–10.5)

## 2024-01-18 PROCEDURE — 99232 SBSQ HOSP IP/OBS MODERATE 35: CPT

## 2024-01-18 PROCEDURE — 99232 SBSQ HOSP IP/OBS MODERATE 35: CPT | Mod: GC

## 2024-01-18 RX ORDER — POTASSIUM CHLORIDE 20 MEQ
40 PACKET (EA) ORAL ONCE
Refills: 0 | Status: COMPLETED | OUTPATIENT
Start: 2024-01-18 | End: 2024-01-18

## 2024-01-18 RX ORDER — NICOTINE POLACRILEX 2 MG
1 GUM BUCCAL DAILY
Refills: 0 | Status: DISCONTINUED | OUTPATIENT
Start: 2024-01-18 | End: 2024-01-19

## 2024-01-18 RX ORDER — LABETALOL HCL 100 MG
300 TABLET ORAL EVERY 8 HOURS
Refills: 0 | Status: DISCONTINUED | OUTPATIENT
Start: 2024-01-18 | End: 2024-01-19

## 2024-01-18 RX ORDER — INFLUENZA VIRUS VACCINE 15; 15; 15; 15 UG/.5ML; UG/.5ML; UG/.5ML; UG/.5ML
0.5 SUSPENSION INTRAMUSCULAR ONCE
Refills: 0 | Status: DISCONTINUED | OUTPATIENT
Start: 2024-01-18 | End: 2024-01-19

## 2024-01-18 RX ADMIN — Medication 25 MILLIGRAM(S): at 05:34

## 2024-01-18 RX ADMIN — Medication 300 MILLIGRAM(S): at 21:40

## 2024-01-18 RX ADMIN — PANTOPRAZOLE SODIUM 40 MILLIGRAM(S): 20 TABLET, DELAYED RELEASE ORAL at 05:26

## 2024-01-18 RX ADMIN — Medication 1 PATCH: at 14:18

## 2024-01-18 RX ADMIN — Medication 1 PATCH: at 09:03

## 2024-01-18 RX ADMIN — Medication 40 MILLIEQUIVALENT(S): at 17:26

## 2024-01-18 RX ADMIN — Medication 300 MILLIGRAM(S): at 12:10

## 2024-01-18 RX ADMIN — Medication 200 MILLIGRAM(S): at 05:25

## 2024-01-18 RX ADMIN — Medication 1 PATCH: at 17:23

## 2024-01-18 RX ADMIN — PANTOPRAZOLE SODIUM 40 MILLIGRAM(S): 20 TABLET, DELAYED RELEASE ORAL at 17:13

## 2024-01-18 RX ADMIN — Medication 1 PATCH: at 12:00

## 2024-01-18 RX ADMIN — Medication 105 MILLIGRAM(S): at 12:03

## 2024-01-18 RX ADMIN — Medication 25 MILLIGRAM(S): at 17:13

## 2024-01-18 RX ADMIN — Medication 2 MILLIGRAM(S): at 02:44

## 2024-01-18 NOTE — PROGRESS NOTE ADULT - PROBLEM SELECTOR PLAN 3
LFTs wnl, low suspicion for biliary obstructive process  - trend LFTs wnl, low suspicion for biliary obstructive process  - normalized

## 2024-01-18 NOTE — PROGRESS NOTE ADULT - SUBJECTIVE AND OBJECTIVE BOX
Progress Note   Enedina Cespedes PGY4- GI/Hep    SUBJECTIVE: Patient seen and examined at bedside.     OBJECTIVE:    VITAL SIGNS:  ICU Vital Signs Last 24 Hrs  T(C): 36.9 (18 Jan 2024 05:31), Max: 36.9 (17 Jan 2024 09:48)  T(F): 98.4 (18 Jan 2024 05:31), Max: 98.4 (17 Jan 2024 09:48)  HR: 104 (18 Jan 2024 05:31) (88 - 104)  BP: 154/111 (18 Jan 2024 05:31) (135/96 - 165/108)  BP(mean): --  ABP: --  ABP(mean): --  RR: 18 (18 Jan 2024 05:31) (18 - 20)  SpO2: 96% (18 Jan 2024 05:31) (96% - 100%)    O2 Parameters below as of 18 Jan 2024 05:31  Patient On (Oxygen Delivery Method): room air        PHYSICAL EXAM:    General: NAD  HEENT: NC/AT  Neck: supple  Respiratory: Regular RR, no increase in WOB  Cardiovascular: RRR  Abdomen: soft, NT/ND; +BS x4  Extremities: WWP, 2+ peripheral pulses b/l  Skin: normal color and turgor; no rash  Neurological: A&OX    MEDICATIONS:  MEDICATIONS  (STANDING):  chlordiazePOXIDE 25 milliGRAM(s) Oral every 12 hours  influenza   Vaccine 0.5 milliLiter(s) IntraMuscular once  labetalol 200 milliGRAM(s) Oral every 8 hours  nicotine -   7 mG/24Hr(s) Patch 1 Patch Transdermal daily  pantoprazole  Injectable 40 milliGRAM(s) IV Push every 12 hours  thiamine IVPB 500 milliGRAM(s) IV Intermittent daily    MEDICATIONS  (PRN):  acetaminophen     Tablet .. 650 milliGRAM(s) Oral every 6 hours PRN Temp greater or equal to 38C (100.4F), Mild Pain (1 - 3)  LORazepam     Tablet 2 milliGRAM(s) Oral every 2 hours PRN CIWA-Ar score increase by 2 points and a total score of 7 or less  LORazepam   Injectable 2 milliGRAM(s) IV Push every 2 hours PRN Symptom-triggered: each CIWA -Ar score 8 or GREATER  melatonin 3 milliGRAM(s) Oral at bedtime PRN Insomnia  ondansetron Injectable 4 milliGRAM(s) IV Push every 8 hours PRN Nausea and/or Vomiting      ALLERGIES:  Allergies    No Known Allergies    Intolerances        LABS:                        14.3   7.86  )-----------( 178      ( 17 Jan 2024 05:49 )             44.8     01-17    139  |  101  |  7   ----------------------------<  109<H>  3.8   |  26  |  0.92    Ca    8.9      17 Jan 2024 05:49  Phos  2.8     01-17  Mg     2.30     01-17    TPro  7.7  /  Alb  4.2  /  TBili  1.1  /  DBili  0.3  /  AST  34  /  ALT  12  /  AlkPhos  61  01-17      Urinalysis Basic - ( 17 Jan 2024 05:49 )    Color: x / Appearance: x / SG: x / pH: x  Gluc: 109 mg/dL / Ketone: x  / Bili: x / Urobili: x   Blood: x / Protein: x / Nitrite: x   Leuk Esterase: x / RBC: x / WBC x   Sq Epi: x / Non Sq Epi: x / Bacteria: x         Progress Note   Enedina Meier- PGY4- GI/Hep    SUBJECTIVE: Patient seen and examined at bedside.   - feeling well   - no more n/v  - no blood in stool, no melena     OBJECTIVE:    VITAL SIGNS:  ICU Vital Signs Last 24 Hrs  T(C): 36.9 (18 Jan 2024 05:31), Max: 36.9 (17 Jan 2024 09:48)  T(F): 98.4 (18 Jan 2024 05:31), Max: 98.4 (17 Jan 2024 09:48)  HR: 104 (18 Jan 2024 05:31) (88 - 104)  BP: 154/111 (18 Jan 2024 05:31) (135/96 - 165/108)  BP(mean): --  ABP: --  ABP(mean): --  RR: 18 (18 Jan 2024 05:31) (18 - 20)  SpO2: 96% (18 Jan 2024 05:31) (96% - 100%)    O2 Parameters below as of 18 Jan 2024 05:31  Patient On (Oxygen Delivery Method): room air        PHYSICAL EXAM:    General: NAD  HEENT: NC/AT  Neck: supple  Respiratory: Regular RR, no increase in WOB  Cardiovascular: RRR  Abdomen: soft, NT/ND; +BS x4  Extremities: WWP, 2+ peripheral pulses b/l  Skin: normal color and turgor; no rash  Neurological: A&OX3    MEDICATIONS:  MEDICATIONS  (STANDING):  chlordiazePOXIDE 25 milliGRAM(s) Oral every 12 hours  influenza   Vaccine 0.5 milliLiter(s) IntraMuscular once  labetalol 200 milliGRAM(s) Oral every 8 hours  nicotine -   7 mG/24Hr(s) Patch 1 Patch Transdermal daily  pantoprazole  Injectable 40 milliGRAM(s) IV Push every 12 hours  thiamine IVPB 500 milliGRAM(s) IV Intermittent daily    MEDICATIONS  (PRN):  acetaminophen     Tablet .. 650 milliGRAM(s) Oral every 6 hours PRN Temp greater or equal to 38C (100.4F), Mild Pain (1 - 3)  LORazepam     Tablet 2 milliGRAM(s) Oral every 2 hours PRN CIWA-Ar score increase by 2 points and a total score of 7 or less  LORazepam   Injectable 2 milliGRAM(s) IV Push every 2 hours PRN Symptom-triggered: each CIWA -Ar score 8 or GREATER  melatonin 3 milliGRAM(s) Oral at bedtime PRN Insomnia  ondansetron Injectable 4 milliGRAM(s) IV Push every 8 hours PRN Nausea and/or Vomiting      ALLERGIES:  Allergies    No Known Allergies    Intolerances        LABS:                        14.3   7.86  )-----------( 178      ( 17 Jan 2024 05:49 )             44.8     01-17    139  |  101  |  7   ----------------------------<  109<H>  3.8   |  26  |  0.92    Ca    8.9      17 Jan 2024 05:49  Phos  2.8     01-17  Mg     2.30     01-17    TPro  7.7  /  Alb  4.2  /  TBili  1.1  /  DBili  0.3  /  AST  34  /  ALT  12  /  AlkPhos  61  01-17      Urinalysis Basic - ( 17 Jan 2024 05:49 )    Color: x / Appearance: x / SG: x / pH: x  Gluc: 109 mg/dL / Ketone: x  / Bili: x / Urobili: x   Blood: x / Protein: x / Nitrite: x   Leuk Esterase: x / RBC: x / WBC x   Sq Epi: x / Non Sq Epi: x / Bacteria: x

## 2024-01-18 NOTE — PROGRESS NOTE ADULT - ATTENDING COMMENTS
No further overt bleeding. Hgb remains stable. Patient switched to librium taper, tentatively planned to finish later today. If patient completes taper and otherwise medically optimized without signs of EtOH withdrawal (or need for PRN ativan), can tentatively plan for EGD tomorrow. Continue PPI in the meantime. Additional recommendations as above.

## 2024-01-18 NOTE — PROGRESS NOTE ADULT - ASSESSMENT
38M with hx of alcohol use disorder (2 pints daily, binge drinking), HTN (never taken medications) with hx of withdrawal presenting with epistaxis/hematemesis    Impression  #epistaxis; resolved  #hematemesis; suspect 2/2 epistaxis; other less likely DDx include MWT vs. PUD; less likely malignancy or esophagitis  - Hgb ranging 14-15; no further vomiting since yesterday evening  - no previous EGD  - no NSAID hx; no FHx of GI malignancy; no hx of liver disease    #EtOH withdrawal; on ativan taper    Recommendations  - trend H/H and monitor for recurrent hematemesis  - can consider EGD pending completion of ativan taper or can follow up as outpatient for EGD if no further hematemesis    Note and recommendations are incomplete until reviewed and attested by attending.    Enedina Meier MD  Gastroenterology/Hepatology Fellow, PGY-4  Please contact via TEAMS    NON-URGENT CONSULTS:  Please email [email or web address]   38M with hx of alcohol use disorder (2 pints daily, binge drinking), HTN (never taken medications) with hx of withdrawal presenting with epistaxis/hematemesis    Impression  #epistaxis; resolved  #hematemesis; suspect 2/2 epistaxis; other less likely DDx include MWT vs. PUD; less likely malignancy or esophagitis  - Hgb ranging 14-15; no further vomiting since yesterday evening  - no previous EGD  - no NSAID hx; no FHx of GI malignancy; no hx of liver disease    #EtOH withdrawal; on ativan taper    Recommendations  - trend H/H and monitor for recurrent hematemesis  - can consider EGD pending completion of ativan taper (now on librium planned to end tomorrow)- potentially can plan for tomorrow   - please make NPO at midnight   - given stability of hbg and resolution of sxs, patient can follow up as outpatient for EGD based on pt preference     Note and recommendations are incomplete until reviewed and attested by attending.    Enedina Meier MD  Gastroenterology/Hepatology Fellow, PGY-4  Please contact via TEAMS    NON-URGENT CONSULTS:  Please email [email or web address]

## 2024-01-18 NOTE — PROGRESS NOTE ADULT - PROBLEM SELECTOR PLAN 2
----- Message from Vasiliy Walsh MD sent at 11/30/2018  1:16 PM CST -----  Call daughter.  Labs all looked ok except his LDL cholesterol was a bit high.  Was high last year as well.  Could start him on low dose statin with Atorvastatin 10 mg nightly.  Call in Rx if agreeable.  Get nerve study as planned to evaluate his episodic weakness in left leg.  
Acute, 2 episodes of bright blood emesis, volume estimated by the pt = 1/2 glass x 2  - denies recurrence here, Hb stable  - c/w Protonix IV BID  - trend Hb, active T&S, transfuse <7  - GI following   - no plan for EGD until s/p taper  - EGD 1/19, NPO at [facility]
Acute, 2 episodes of bright blood emesis, volume estimated by the pt = 1/2 glass x 2  - denies recurrence here, Hb stable  - c/w Protonix IV BID  - trend Hb, active T&S, transfuse <7  - GI following   - no plan for EGD until s/p taper  - EGD IP vs OP
Heavy daily intake c/w tolerance and dependence;   - CIWA with Ativan IVP PRN per protocol, also on Ativan IV Taper   - c/w Thiamine high dose IVPB x5 days   - seizure, fall precautions

## 2024-01-18 NOTE — PROGRESS NOTE ADULT - SUBJECTIVE AND OBJECTIVE BOX
Patient is a 38y old  Male who presents with a chief complaint of Vomiting blood x 2 episodes     SUBJECTIVE / OVERNIGHT EVENTS:    No CP, SOB, f/c/n/v  Reports no further bleeding or emesis  feels improved  would be amenable to EGD tomorrow if GI able to accommodate     MEDICATIONS  (STANDING):  chlordiazePOXIDE 25 milliGRAM(s) Oral every 12 hours  influenza   Vaccine 0.5 milliLiter(s) IntraMuscular once  labetalol 300 milliGRAM(s) Oral every 8 hours  nicotine -  14 mG/24Hr(s) Patch 1 Patch Transdermal daily  pantoprazole  Injectable 40 milliGRAM(s) IV Push every 12 hours  thiamine IVPB 500 milliGRAM(s) IV Intermittent daily    MEDICATIONS  (PRN):  acetaminophen     Tablet .. 650 milliGRAM(s) Oral every 6 hours PRN Temp greater or equal to 38C (100.4F), Mild Pain (1 - 3)  LORazepam     Tablet 2 milliGRAM(s) Oral every 2 hours PRN CIWA-Ar score increase by 2 points and a total score of 7 or less  LORazepam   Injectable 2 milliGRAM(s) IV Push every 2 hours PRN Symptom-triggered: each CIWA -Ar score 8 or GREATER  melatonin 3 milliGRAM(s) Oral at bedtime PRN Insomnia  ondansetron Injectable 4 milliGRAM(s) IV Push every 8 hours PRN Nausea and/or Vomiting    T(C): 36.6 (01-18-24 @ 11:43), Max: 36.9 (01-18-24 @ 05:31)  HR: 95 (01-18-24 @ 11:43) (88 - 104)  BP: 145/87 (01-18-24 @ 11:43) (135/96 - 154/111)  RR: 18 (01-18-24 @ 11:43) (18 - 18)  SpO2: 100% (01-18-24 @ 11:43) (96% - 100%)    PHYSICAL EXAM:  GENERAL: NA,obese   CHEST/LUNG: Clear to auscultation bilaterally; No wheeze  HEART: Regular rate and rhythm; No murmurs, rubs, or gallops  ABDOMEN: Soft, Nontender, Nondistended; Bowel sounds present  EXTREMITIES:   warm and well perfused, No clubbing, cyanosis, or edema  PSYCH: AAOx3  NEUROLOGY: non-focal, no tremors     LABS:                        14.3   8.08  )-----------( 177      ( 18 Jan 2024 06:00 )             44.5     01-18    138  |  101  |  10  ----------------------------<  136<H>  3.4<L>   |  22  |  0.94    Ca    8.9      18 Jan 2024 08:01  Phos  2.9     01-18  Mg     2.10     01-18    TPro  7.5  /  Alb  4.1  /  TBili  0.9  /  DBili  <0.2  /  AST  28  /  ALT  15  /  AlkPhos  63  01-18     Care Discussed with Consultants/Other Providers: Dr. Filippo BARBOSA

## 2024-01-18 NOTE — PROGRESS NOTE ADULT - ASSESSMENT
38M obese (BMI 39.9), smoker, EtOH disorder, HTN (no medications) a/w hematemesis and alcohol dependence with withdrawal.

## 2024-01-18 NOTE — PROGRESS NOTE ADULT - PROBLEM SELECTOR PLAN 4
Uncontrolled, exacerbated by alcohol withdrawal; was not compliant with home Lisinopril and HCTZ  - c/w Labetalol 100mg po q8h while withdrawing from alcohol   -  monitor BP closely
Uncontrolled, exacerbated by alcohol withdrawal; was not compliant with home Lisinopril and HCTZ  - c/w Labetalol, increase to 200 mg TID  -  monitor BP closely  - reports he has a PCP, will provide name when has his cellphone
Uncontrolled, exacerbated by alcohol withdrawal; was not compliant with home Lisinopril and HCTZ  - c/w Labetalol, increase to 300 mg TID (likely an dc on 400 mg BID)  -  monitor BP closely  - reports he has a PCP, will provide name when has his cellphone

## 2024-01-18 NOTE — PROGRESS NOTE ADULT - PROBLEM SELECTOR PLAN 6
SCD, ambulatory  SBIRT  no PT needs  dc pending detox, +/- EGD

## 2024-01-19 ENCOUNTER — TRANSCRIPTION ENCOUNTER (OUTPATIENT)
Age: 39
End: 2024-01-19

## 2024-01-19 VITALS
OXYGEN SATURATION: 100 % | TEMPERATURE: 98 F | HEART RATE: 77 BPM | DIASTOLIC BLOOD PRESSURE: 95 MMHG | RESPIRATION RATE: 18 BRPM | SYSTOLIC BLOOD PRESSURE: 136 MMHG

## 2024-01-19 LAB
ANION GAP SERPL CALC-SCNC: 12 MMOL/L — SIGNIFICANT CHANGE UP (ref 7–14)
BUN SERPL-MCNC: 16 MG/DL — SIGNIFICANT CHANGE UP (ref 7–23)
CALCIUM SERPL-MCNC: 8.8 MG/DL — SIGNIFICANT CHANGE UP (ref 8.4–10.5)
CHLORIDE SERPL-SCNC: 104 MMOL/L — SIGNIFICANT CHANGE UP (ref 98–107)
CO2 SERPL-SCNC: 23 MMOL/L — SIGNIFICANT CHANGE UP (ref 22–31)
CREAT SERPL-MCNC: 1.08 MG/DL — SIGNIFICANT CHANGE UP (ref 0.5–1.3)
EGFR: 91 ML/MIN/1.73M2 — SIGNIFICANT CHANGE UP
GLUCOSE SERPL-MCNC: 102 MG/DL — HIGH (ref 70–99)
HCT VFR BLD CALC: 44.2 % — SIGNIFICANT CHANGE UP (ref 39–50)
HGB BLD-MCNC: 13.9 G/DL — SIGNIFICANT CHANGE UP (ref 13–17)
MAGNESIUM SERPL-MCNC: 2.2 MG/DL — SIGNIFICANT CHANGE UP (ref 1.6–2.6)
MCHC RBC-ENTMCNC: 25.2 PG — LOW (ref 27–34)
MCHC RBC-ENTMCNC: 31.4 GM/DL — LOW (ref 32–36)
MCV RBC AUTO: 80.2 FL — SIGNIFICANT CHANGE UP (ref 80–100)
NRBC # BLD: 0 /100 WBCS — SIGNIFICANT CHANGE UP (ref 0–0)
NRBC # FLD: 0 K/UL — SIGNIFICANT CHANGE UP (ref 0–0)
PHOSPHATE SERPL-MCNC: 3.6 MG/DL — SIGNIFICANT CHANGE UP (ref 2.5–4.5)
PLATELET # BLD AUTO: 179 K/UL — SIGNIFICANT CHANGE UP (ref 150–400)
POTASSIUM SERPL-MCNC: 3.8 MMOL/L — SIGNIFICANT CHANGE UP (ref 3.5–5.3)
POTASSIUM SERPL-SCNC: 3.8 MMOL/L — SIGNIFICANT CHANGE UP (ref 3.5–5.3)
RBC # BLD: 5.51 M/UL — SIGNIFICANT CHANGE UP (ref 4.2–5.8)
RBC # FLD: 20.3 % — HIGH (ref 10.3–14.5)
SODIUM SERPL-SCNC: 139 MMOL/L — SIGNIFICANT CHANGE UP (ref 135–145)
WBC # BLD: 8.31 K/UL — SIGNIFICANT CHANGE UP (ref 3.8–10.5)
WBC # FLD AUTO: 8.31 K/UL — SIGNIFICANT CHANGE UP (ref 3.8–10.5)

## 2024-01-19 PROCEDURE — 99239 HOSP IP/OBS DSCHRG MGMT >30: CPT

## 2024-01-19 PROCEDURE — 43239 EGD BIOPSY SINGLE/MULTIPLE: CPT | Mod: GC

## 2024-01-19 PROCEDURE — 88305 TISSUE EXAM BY PATHOLOGIST: CPT | Mod: 26

## 2024-01-19 RX ORDER — SODIUM CHLORIDE 9 MG/ML
1000 INJECTION, SOLUTION INTRAVENOUS
Refills: 0 | Status: DISCONTINUED | OUTPATIENT
Start: 2024-01-19 | End: 2024-01-19

## 2024-01-19 RX ORDER — NICOTINE POLACRILEX 2 MG
1 GUM BUCCAL
Qty: 30 | Refills: 0
Start: 2024-01-19 | End: 2024-02-17

## 2024-01-19 RX ORDER — PANTOPRAZOLE SODIUM 20 MG/1
40 TABLET, DELAYED RELEASE ORAL
Refills: 0 | Status: DISCONTINUED | OUTPATIENT
Start: 2024-01-20 | End: 2024-01-19

## 2024-01-19 RX ORDER — LABETALOL HCL 100 MG
1 TABLET ORAL
Qty: 0 | Refills: 0 | DISCHARGE
Start: 2024-01-19

## 2024-01-19 RX ORDER — PANTOPRAZOLE SODIUM 20 MG/1
1 TABLET, DELAYED RELEASE ORAL
Qty: 28 | Refills: 0
Start: 2024-01-19 | End: 2024-02-15

## 2024-01-19 RX ADMIN — Medication 105 MILLIGRAM(S): at 11:41

## 2024-01-19 RX ADMIN — Medication 300 MILLIGRAM(S): at 12:36

## 2024-01-19 RX ADMIN — PANTOPRAZOLE SODIUM 40 MILLIGRAM(S): 20 TABLET, DELAYED RELEASE ORAL at 07:06

## 2024-01-19 RX ADMIN — Medication 1 PATCH: at 11:40

## 2024-01-19 RX ADMIN — Medication 300 MILLIGRAM(S): at 07:06

## 2024-01-19 RX ADMIN — Medication 1 PATCH: at 07:41

## 2024-01-19 RX ADMIN — Medication 1 PATCH: at 11:41

## 2024-01-19 RX ADMIN — SODIUM CHLORIDE 30 MILLILITER(S): 9 INJECTION, SOLUTION INTRAVENOUS at 08:34

## 2024-01-19 NOTE — DISCHARGE NOTE NURSING/CASE MANAGEMENT/SOCIAL WORK - PATIENT PORTAL LINK FT
You can access the FollowMyHealth Patient Portal offered by [facility] by registering at the following website: [email or web address]. By joining [facility]’s FollowMyHealth portal, you will also be able to view your health information using other applications (apps) compatible with our system.

## 2024-01-26 LAB — SURGICAL PATHOLOGY STUDY: SIGNIFICANT CHANGE UP

## 2024-01-29 PROBLEM — Z00.00 ENCOUNTER FOR PREVENTIVE HEALTH EXAMINATION: Status: ACTIVE | Noted: 2024-01-29

## 2024-01-31 ENCOUNTER — NON-APPOINTMENT (OUTPATIENT)
Age: 39
End: 2024-01-31

## 2024-02-19 DIAGNOSIS — A04.8 OTHER SPECIFIED BACTERIAL INTESTINAL INFECTIONS: ICD-10-CM

## 2024-02-19 RX ORDER — METRONIDAZOLE 250 MG/1
250 TABLET ORAL EVERY 6 HOURS
Qty: 40 | Refills: 0 | Status: ACTIVE | COMMUNITY
Start: 2024-02-19 | End: 1900-01-01

## 2024-02-19 RX ORDER — BISMUTH SUBSALICYLATE 262 MG/1
262 TABLET, CHEWABLE ORAL 4 TIMES DAILY
Qty: 80 | Refills: 0 | Status: ACTIVE | COMMUNITY
Start: 2024-02-19 | End: 1900-01-01

## 2024-02-19 RX ORDER — TETRACYCLINE HYDROCHLORIDE 500 MG/1
500 CAPSULE ORAL EVERY 6 HOURS
Qty: 40 | Refills: 0 | Status: ACTIVE | COMMUNITY
Start: 2024-02-19 | End: 1900-01-01

## 2024-02-19 RX ORDER — OMEPRAZOLE 20 MG/1
20 CAPSULE, DELAYED RELEASE ORAL TWICE DAILY
Qty: 20 | Refills: 0 | Status: ACTIVE | COMMUNITY
Start: 2024-02-19 | End: 1900-01-01

## 2024-02-25 ENCOUNTER — EMERGENCY (EMERGENCY)
Facility: HOSPITAL | Age: 39
LOS: 1 days | Discharge: HOME | End: 2024-02-25
Attending: EMERGENCY MEDICINE | Admitting: EMERGENCY MEDICINE
Payer: MEDICAID

## 2024-02-25 ENCOUNTER — TRANSCRIPTION ENCOUNTER (OUTPATIENT)
Age: 39
End: 2024-02-25

## 2024-02-25 VITALS
HEART RATE: 98 BPM | HEIGHT: 64.96 IN | OXYGEN SATURATION: 95 % | SYSTOLIC BLOOD PRESSURE: 128 MMHG | TEMPERATURE: 98 F | RESPIRATION RATE: 17 BRPM | DIASTOLIC BLOOD PRESSURE: 81 MMHG

## 2024-02-25 PROCEDURE — 93010 ELECTROCARDIOGRAM REPORT: CPT

## 2024-02-25 PROCEDURE — 99284 EMERGENCY DEPT VISIT MOD MDM: CPT | Mod: 25

## 2024-02-25 NOTE — ED ADULT TRIAGE NOTE - INTERNATIONAL TRAVEL
No Continue Regimen: tretinoin 0.25% topical cream, Apply a pea size amount to the face qhs. Detail Level: Zone Initiate Treatment: metronidazole 0.75 % topical cream, Apply to face once daily

## 2024-02-25 NOTE — ED ADULT TRIAGE NOTE - CHIEF COMPLAINT QUOTE
pt complains of CP, nausea, vomiting, and generalized body pain. EMS stated at the scene he broke in to his brother house and the brother called EMS. pt admits to drinking daily drinking and empty liter bottle of Vodka found at the scene. pt not being compliant in triage to answer questions.

## 2024-02-26 VITALS
SYSTOLIC BLOOD PRESSURE: 129 MMHG | DIASTOLIC BLOOD PRESSURE: 73 MMHG | HEART RATE: 92 BPM | RESPIRATION RATE: 18 BRPM | TEMPERATURE: 98 F | OXYGEN SATURATION: 96 %

## 2024-02-26 NOTE — ED PROVIDER NOTE - CONDITION AT DISCHARGE:
Medical Week 1 Survey      Responses   [facility] patient discharged from? [facility]   Does the patient have one of the following disease processes/diagnoses(primary or secondary)? Other   Week 1 attempt successful? Yes   Call start time [id number]   Call end time 1602   Discharge diagnosis Elevated INR    Is the patient taking all medications as directed (includes completed medication regime)? Yes   Medication comments Pt states her PCP started her on antibiotic for UTI but pharmacist was concerned due to her taking warfarin.  She has contact cardiology regarding this and waiting to hear back from the.   Does the patient have a primary care provider?  Yes   Has the patient kept scheduled appointments due by today? Yes   Psychosocial issues? No   Did the patient receive a copy of their discharge instructions? Yes   Nursing interventions Reviewed instructions with patient   What is the patient's perception of their health status since discharge? New symptoms unrelated to diagnosis   Is the patient/caregiver able to teach back signs and symptoms related to disease process for when to call PCP? Yes   Is the patient/caregiver able to teach back signs and symptoms related to disease process for when to call 911? Yes   Is the patient/caregiver able to teach back the hierarchy of who to call/visit for symptoms/problems? PCP, Specialist, Home health nurse, Urgent Care, ED, 911 Yes   Additional teach back comments States colitis has been under control but has UTI.  She is trying to find out if she is to take the medication that her PCP prescribed due to pharmacist was concerned.  Her INR today was 2.8.  She is hoping to hear from Dr. Henley office today but if not she will contact them again tomorrow.   Week 1 call completed? Yes   Wrap up additional comments Pt is waiting on return call from cardiologist.          Reyna Arambula LPN  
Improved

## 2024-02-26 NOTE — ED PROVIDER NOTE - CLINICAL SUMMARY MEDICAL DECISION MAKING FREE TEXT BOX
39 y/o M with h/o ETOH abuse BIB EMS for alcohol intoxication.  ?report of chest pain, but pt denies currently.  EKG unchanged from prior.  He is HD stable, sleeping comfortably.  Attempted to obtain collateral from mother who is listed emergency contact without response.  Will reassess for sobriety.

## 2024-02-26 NOTE — ED PROVIDER NOTE - PATIENT PORTAL LINK FT
You can access the FollowMyHealth Patient Portal offered by [facility] by registering at the following website: [email or web address]. By joining [facility]’s FollowMyHealth portal, you will also be able to view your health information using other applications (apps) compatible with our system.

## 2024-02-26 NOTE — ED PROVIDER NOTE - PROGRESS NOTE DETAILS
Zurdo Corral PGY3: pt reassessed, clinically sober, tolerating PO, metro card provided. Return precautions discussed, verbal understanding demonstrated, all questions answered.

## 2024-02-26 NOTE — ED PROVIDER NOTE - CONSTITUTIONAL MENTATION
Body Location Override (Optional - Billing Will Still Be Based On Selected Body Map Location If Applicable): right nasal sidewall Detail Level: Detailed Depth Of Biopsy: dermis Was A Bandage Applied: Yes Size Of Lesion In Cm: 0 Biopsy Type: H and E Biopsy Method: Dermablade Anesthesia Type: 0.5% lidocaine with 1:200,000 epinephrine and a 1:10 solution of 8.4% sodium bicarbonate and 408mcg clindamycin/ml [FreeTextEntry1] : nausea- stop NSAID. start PPI\par knee pain- chronic- pt will try topical\par DM- pt to ck her glucose. \par EKG-Sinus shelley 58 .  ant- septal t wave inversion- pt to release old EKG- no chg c/o 9/18\par pt to release vaccination rec.  Anesthesia Volume In Cc: 2.5 Hemostasis: Drysol Wound Care: Aquaphor Dressing: bandage Destruction After The Procedure: No Type Of Destruction Used: Curettage Curettage Text: The wound bed was treated with curettage after the biopsy was performed. Cryotherapy Text: The wound bed was treated with cryotherapy after the biopsy was performed. Electrodesiccation Text: The wound bed was treated with electrodesiccation after the biopsy was performed. Electrodesiccation And Curettage Text: The wound bed was treated with electrodesiccation and curettage after the biopsy was performed. Silver Nitrate Text: The wound bed was treated with silver nitrate after the biopsy was performed. Lab: [id number] Lab Facility:  Consent: Written consent was obtained and risks were reviewed including but not limited to scarring, infection, bleeding, scabbing, incomplete removal, nerve damage and allergy to anesthesia. Post-Care Instructions: I reviewed with the patient in detail post-care instructions. Patient is to keep the biopsy site dry overnight, and then apply bacitracin twice daily until healed. Patient may apply hydrogen peroxide soaks to remove any crusting. Notification Instructions: Patient will be notified of biopsy results. However, patient instructed to call the office if not contacted within 2 weeks. Billing Type: Third-Party Bill Information: Selecting Yes will display possible errors in your note based on the variables you have selected. This validation is only offered as a suggestion for you. PLEASE NOTE THAT THE VALIDATION TEXT WILL BE REMOVED WHEN YOU FINALIZE YOUR NOTE. IF YOU WANT TO FAX A PRELIMINARY NOTE YOU WILL NEED TO TOGGLE THIS TO 'NO' IF YOU DO NOT WANT IT IN YOUR FAXED NOTE. awake/alert

## 2024-02-26 NOTE — ED ADULT NURSE NOTE - NSFALLRISKINTERV_ED_ALL_ED
Assistance OOB with selected safe patient handling equipment if applicable/Assistance with ambulation/Communicate fall risk and risk factors to all staff, patient, and family/Monitor gait and stability/Monitor for mental status changes and reorient to person, place, and time, as needed/Provide visual cue: yellow wristband, yellow gown, etc/Reinforce activity limits and safety measures with patient and family/Toileting schedule using arm’s reach rule for commode and bathroom/Use of alarms - bed, stretcher, chair and/or video monitoring/Call bell, personal items and telephone in reach/Instruct patient to call for assistance before getting out of bed/chair/stretcher/Non-slip footwear applied when patient is off stretcher/[place] to call system/Physically safe environment - no spills, clutter or unnecessary equipment/Purposeful Proactive Rounding/Room/bathroom lighting operational, light cord in reach

## 2024-02-26 NOTE — ED ADULT NURSE NOTE - OBJECTIVE STATEMENT
Patient received in spot 12a. A&O3. pt complains of CP, nausea, vomiting, and generalized body pain. EMS stated at the scene he broke in to his brother house and the brother called EMS. pt admits to drinking daily drinking and empty liter bottle of Vodka found at the scene. pt not being compliant in triage to answer questions. Respirations even and unlabored. No signs of acute distress noted. Stretcher in lowest position. Pending MD orders. Patients belongings collected in triage, placed across room 21.

## 2024-02-26 NOTE — ED PROVIDER NOTE - OBJECTIVE STATEMENT
39 y/o M with h/o ETOH abuse, gastritis BIB EMS for alcohol intoxication.  Pt reportedly found intoxicated attempting to break into brother's home per EMS.  Pt found with empty bottles of vodka.  Reported chest pain to EMS?  Pt is sleeping here, arousable to voice and able to answer basic questions but quickly falls back asleep.  He states he knows he's at [facility] but does not recall how he got here and who called EMS.  He endorses ETOH use, denies drug use.  States he lives at home with family.  Denies any active complaints.

## 2024-04-11 ENCOUNTER — APPOINTMENT (OUTPATIENT)
Dept: GASTROENTEROLOGY | Facility: CLINIC | Age: 39
End: 2024-04-11

## 2024-09-15 NOTE — DISCHARGE NOTE ADULT - PHYSICIAN SECTION COMPLETE
Yes 1 Principal Discharge DX:	Abdominal pain  Secondary Diagnosis:	Gastritis  Secondary Diagnosis:	Constipation

## 2024-10-25 NOTE — DISCHARGE NOTE PROVIDER - DISCHARGE SERVICE FOR PATIENT
Contacted the patient to schedule an endoscopy procedure(s) Colonoscopy. The patient did not answer the call and left a voice message requesting a call back.     
on the discharge service for the patient. I have reviewed and made amendments to the documentation where necessary.

## 2024-12-22 NOTE — PATIENT PROFILE ADULT - PATIENT REPRESENTATIVE: ( YOU CAN CHOOSE ANY PERSON THAT CAN ASSIST YOU WITH YOUR HEALTH CARE PREFERENCES, DOES NOT HAVE TO BE A SPOUSE, IMMEDIATE FAMILY OR SIGNIFICANT OTHER/PARTNER)
S/p trach for NM disease.   Baseline Vent dependent  Trach 6XL  History of significant mucous plugs  CT chest revealing complete collapse of the left lower lobe and severe atelectasis of the right lower lobe similar to prior examination. Superimposed aspiration or pneumonia cannot be excluded. Trace left pleural effusion.     Plan:  Continue ACVC 14/500/40/6+ (home settings)  Aggressive pulmonary hygiene   Mucomyst, albuterol  Chest therapy  Frequent suctioning/turning  Antibiotics as above  F/u OP with Pulmonology    declines
